# Patient Record
Sex: FEMALE | Race: BLACK OR AFRICAN AMERICAN | Employment: OTHER | URBAN - METROPOLITAN AREA
[De-identification: names, ages, dates, MRNs, and addresses within clinical notes are randomized per-mention and may not be internally consistent; named-entity substitution may affect disease eponyms.]

---

## 2017-01-26 ENCOUNTER — OFFICE VISIT (OUTPATIENT)
Dept: CARDIOLOGY CLINIC | Age: 82
End: 2017-01-26

## 2017-01-26 VITALS
HEIGHT: 63 IN | SYSTOLIC BLOOD PRESSURE: 122 MMHG | WEIGHT: 189 LBS | BODY MASS INDEX: 33.49 KG/M2 | DIASTOLIC BLOOD PRESSURE: 55 MMHG | HEART RATE: 83 BPM

## 2017-01-26 DIAGNOSIS — E78.5 HYPERLIPIDEMIA, UNSPECIFIED HYPERLIPIDEMIA TYPE: ICD-10-CM

## 2017-01-26 DIAGNOSIS — I50.32 CHRONIC DIASTOLIC HEART FAILURE (HCC): Primary | ICD-10-CM

## 2017-01-26 DIAGNOSIS — I05.9 MITRAL VALVE DISORDERS(424.0): ICD-10-CM

## 2017-01-26 DIAGNOSIS — I10 ESSENTIAL HYPERTENSION, BENIGN: ICD-10-CM

## 2017-01-26 NOTE — MR AVS SNAPSHOT
Visit Information Date & Time Provider Department Dept. Phone Encounter #  
 1/26/2017 10:30 AM Leah Butler MD [facility] [address] [place] [address] [id number] Follow-up Instructions Return in about 6 months (around 7/26/2017). Your Appointments 7/27/2017 10:30 AM  
ESTABLISHED PATIENT with Leah Butelr MD  
[facility]) Appt Note: 6 months [address] [facility], [address] [place] [address]  
[address] [facility], [address] [place] [address] Upcoming Health Maintenance Date Due DTaP/Tdap/Td series (1 - Tdap) 4/6/1956 ZOSTER VACCINE AGE 60> 4/6/1996 GLAUCOMA SCREENING Q2Y 4/6/2001 Pneumococcal 65+ Low/Medium Risk (1 of 2 - PCV13) 4/6/2001 MEDICARE YEARLY EXAM 4/6/2001 INFLUENZA AGE 9 TO ADULT 8/1/2016 Allergies as of 1/26/2017  Review Complete On: 1/26/2017 By: Leah Butler MD  
  
 Severity Noted Reaction Type Reactions Erythromycin    Not Reported This Time Current Immunizations  Never Reviewed No immunizations on file. Not reviewed this visit You Were Diagnosed With   
  
 Codes Comments Chronic diastolic heart failure (HCC)    -  Primary ICD-10-CM: I50.32 
ICD-9-CM: 428.32 stable 
mild edema Essential hypertension, benign     ICD-10-CM: I10 
ICD-9-CM: 401.1 stable Hyperlipidemia, unspecified hyperlipidemia type     ICD-10-CM: E78.5 ICD-9-CM: 272.4 stable Mitral valve disorders     ICD-10-CM: I05.9 ICD-9-CM: 424.0 trivial on last echo Vitals BP Pulse Height(growth percentile) Weight(growth percentile) BMI Smoking Status 122/55 83 5' 3\" (1.6 m) 189 lb (85.7 kg) 33.48 kg/m2 Never Smoker Vitals History BMI and BSA Data Body Mass Index Body Surface Area  
 33.48 kg/m 2 1.95 m 2 Preferred Pharmacy Pharmacy Name Phone [facility] [address] [facility], [address] [facility] [phone] Your Updated Medication List  
  
   
This list is accurate as of: 1/26/17 10:59 AM.  Always use your most recent med list.  
  
  
  
  
 aspirin 81 mg tablet Take 81 mg by mouth daily. atorvastatin 10 mg tablet Commonly known as:  LIPITOR  
take 1 tablet by mouth once daily  
  
 calcium 500 mg Tab Take  by mouth daily. cephALEXin 500 mg capsule Commonly known as:  KEFLEX  
  
 cholecalciferol 1,000 unit Cap Commonly known as:  VITAMIN D3 Take  by mouth daily. cyanocobalamin 1,000 mcg tablet Take 1,000 mcg by mouth daily. FEMARA 2.5 mg tablet Generic drug:  letrozole Take 2.5 mg by mouth daily. IBUPROFEN PO Take  by mouth as needed. metoprolol succinate 50 mg XL tablet Commonly known as:  TOPROL-XL  
take 1 tablet by mouth once daily  
  
 multivitamins with minerals Cap Take  by mouth daily. nitroglycerin 0.4 mg SL tablet Commonly known as:  NITROSTAT  
1 Tab by SubLINGual route every five (5) minutes as needed. Omega-3-DHA-EPA-Fish Oil 1,000 mg (120 mg-180 mg) Cap Take  by mouth daily. TYLENOL PO Take  by mouth as needed. valACYclovir 500 mg tablet Commonly known as:  VALTREX  
1,000 mg.  
  
 valsartan-hydroCHLOROthiazide 320-12.5 mg per tablet Commonly known as:  DIOVAN-HCT  
take 1 tablet by mouth once daily ZyrTEC 10 mg Cap Generic drug:  Cetirizine Take 10 mg by mouth as needed. Follow-up Instructions Return in about 6 months (around 7/26/2017). Introducing [facility]! David Pizano introduces Zachary Prell patient portal. Now you can access parts of your medical record, email your doctor's office, and request medication refills online. 1. In your internet browser, go to [email or web address] 2. Click on the First Time User? Click Here link in the Sign In box. You will see the New Member Sign Up page. 3. Enter your [employer] Access Code exactly as it appears below. You will not need to use this code after youve completed the sign-up process. If you do not sign up before the expiration date, you must request a new code. · [employer] Access Code: [email or web address] Expires: 4/26/2017 10:29 AM 
 
4. Enter the last four digits of your Social Security Number (xxxx) and Date of Birth (mm/dd/yyyy) as indicated and click Submit. You will be taken to the next sign-up page. 5. Create a [employer] ID. This will be your [employer] login ID and cannot be changed, so think of one that is secure and easy to remember. 6. Create a [employer] password. You can change your password at any time. 7. Enter your Password Reset Question and Answer. This can be used at a later time if you forget your password. 8. Enter your e-mail address. You will receive e-mail notification when new information is available in [address] 9. Click Sign Up. You can now view and download portions of your medical record. 10. Click the Download Summary menu link to download a portable copy of your medical information. If you have questions, please visit the Frequently Asked Questions section of the [employer] website. Remember, [employer] is NOT to be used for urgent needs. For medical emergencies, dial 911. Now available from your iPhone and Android! Please provide this summary of care documentation to your next provider. Your primary care clinician is listed as [facility]. If you have any questions after today's visit, please call [phone].

## 2017-01-26 NOTE — PROGRESS NOTES
HISTORY OF PRESENT ILLNESS  Ambar Saab is a [de-identified] y.o. female. HPI Comments: Patient had fever and chills last week -resolved with nsaids-no recurrence    CHF   The history is provided by the patient. This is a chronic problem. The problem has not changed since onset. Associated symptoms include shortness of breath. Pertinent negatives include no chest pain, no abdominal pain and no headaches. The symptoms are aggravated by exertion. Valvular Heart Disease   The history is provided by the patient. This is a chronic problem. The problem occurs constantly. The problem has been gradually improving. Associated symptoms include shortness of breath. Pertinent negatives include no chest pain, no abdominal pain and no headaches. Shortness of Breath   The history is provided by the patient. The problem occurs intermittently. The problem has been gradually improving. Pertinent negatives include no fever, no headaches, no cough, no sputum production, no hemoptysis, no wheezing, no PND, no orthopnea, no chest pain, no vomiting, no abdominal pain, no rash, no leg swelling and no claudication. The problem's precipitants include exercise (exertion). Review of Systems   Constitutional: Negative for chills and fever. HENT: Negative for nosebleeds. Eyes: Negative for blurred vision and double vision. Respiratory: Positive for shortness of breath. Negative for cough, hemoptysis, sputum production and wheezing. Cardiovascular: Negative for chest pain, palpitations, orthopnea, claudication, leg swelling and PND. Gastrointestinal: Negative for abdominal pain, heartburn, nausea and vomiting. Musculoskeletal: Negative for myalgias. Skin: Negative for rash. Neurological: Negative for dizziness, weakness and headaches. Endo/Heme/Allergies: Does not bruise/bleed easily.      Family History   Problem Relation Age of Onset    Cancer Mother      colon     Heart Disease Father     Breast Cancer Sister    Linda Heredia Cancer Sister 36     Breast    Diabetes Other      parent       Past Medical History   Diagnosis Date    [facility] 3/12    Cancer ([facility] [facility]      renal    Cancer ([facility]      Right breast    Chronic diastolic heart failure ([facility] [facility] 8/15/2014     exertional sob stable nyha class2     Colon polyps     Congestive heart failure ([facility]     Coronary atherosclerosis of native coronary artery     Essential hypertension, benign     Heart murmur     History of breast cancer     Hypertension     Mitral valve disorders     Mitral valve regurgitation     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     Other and unspecified hyperlipidemia     Radiation therapy     Radiation therapy complication        Past Surgical History   Procedure Laterality Date    Hx mastectomy  2012     Partial right    Hx hysterectomy      Hx nephrectomy  2002     right    Romana biopsy breast stereotactic         Social History   Substance Use Topics    Smoking status: Never Smoker    Smokeless tobacco: Never Used    Alcohol use Yes      Comment: occasinally       Allergies   Allergen Reactions    Erythromycin Not Reported This Time       Current Outpatient Prescriptions   Medication Sig    metoprolol succinate (TOPROL-XL) 50 mg XL tablet take 1 tablet by mouth once daily    atorvastatin (LIPITOR) 10 mg tablet take 1 tablet by mouth once daily    valsartan-hydrochlorothiazide (DIOVAN-HCT) 320-12.5 mg per tablet take 1 tablet by mouth once daily    cyanocobalamin 1,000 mcg tablet Take 1,000 mcg by mouth daily.  cholecalciferol (VITAMIN D3) 1,000 unit cap Take  by mouth daily.  nitroglycerin (NITROSTAT) 0.4 mg SL tablet 1 Tab by SubLINGual route every five (5) minutes as needed.  valACYclovir (VALTREX) 500 mg tablet 1,000 mg.  calcium 500 mg Tab Take  by mouth daily.  Omega-3-DHA-EPA-Fish Oil 1,000 (120-180) mg cap Take  by mouth daily.  Cetirizine (ZYRTEC) 10 mg cap Take 10 mg by mouth as needed.     aspirin 81 mg tablet Take 81 mg by mouth daily.  multivitamins with minerals Cap Take  by mouth daily.  letrozole (FEMARA) 2.5 mg tablet Take 2.5 mg by mouth daily.  ACETAMINOPHEN (TYLENOL PO) Take  by mouth as needed.  IBUPROFEN PO Take  by mouth as needed.  cephALEXin (KEFLEX) 500 mg capsule      No current facility-administered medications for this visit. Visit Vitals    /55    Pulse 83    Ht 5' 3\" (1.6 m)    Wt 85.7 kg (189 lb)    BMI 33.48 kg/m2         Physical Exam   Constitutional: She is oriented to person, place, and time. She appears well-developed and well-nourished. HENT:   Head: Normocephalic and atraumatic. Eyes: Conjunctivae are normal.   Neck: Neck supple. No JVD present. No tracheal deviation present. No thyromegaly present. Cardiovascular: Normal rate and regular rhythm. PMI is not displaced. Exam reveals no gallop and no decreased pulses. Murmur heard. Early systolic murmur is present with a grade of 2/6  at the upper right sternal border  Pulmonary/Chest: No respiratory distress. She has no wheezes. She has no rales. She exhibits no tenderness. Abdominal: Soft. There is no tenderness. Musculoskeletal: She exhibits no edema. Neurological: She is alert and oriented to person, place, and time. Skin: Skin is warm. Psychiatric: She has a normal mood and affect. CARDIOLOGY STUDIES 1/30/2013   Myocardial Perfusion Scan Result 3/07 mild ant wall defect with partial reversibility;   Echocardiogram - Complete Result 3/10 NORMAL EF,MILD MR,MILD TR;     Ms. Jadyn Ma has a reminder for a \"due or due soon\" health maintenance. I have asked that she contact her primary care provider for follow-up on this health maintenance. SUMMARY:echo:5/2016  Left ventricle: The cavity was small. Systolic function was normal.  Ejection fraction was estimated to be 65 %. No obvious wall motion  abnormalities identified in the views obtained.  There was mild concentric  hypertrophy. Doppler parameters were consistent with abnormal left  ventricular relaxation (grade 1 diastolic dysfunction). Mitral valve: There was trivial regurgitation. Tricuspid valve: Tricuspid regurgitation peak velocity: 2 m/sec. Pulmonary  artery systolic pressure: 19 mmHg. Assessment       ICD-10-CM ICD-9-CM    1. Chronic diastolic heart failure (HCC) I50.32 428.32     stable  mild edema   2. Essential hypertension, benign I10 401.1     stable   3. Hyperlipidemia, unspecified hyperlipidemia type E78.5 272.4     stable   4. Mitral valve disorders I05.9 424.0     trivial on last echo       No orders of the defined types were placed in this encounter. Follow-up Disposition:  Return in about 6 months (around 7/26/2017).

## 2017-01-26 NOTE — PROGRESS NOTES
1. Have you been to the ER, urgent care clinic since your last visit? Hospitalized since your last visit? No    2. Have you seen or consulted any other health care providers outside of the [address] [place] [address] since your last visit? Include any pap smears or colon screening. No     3. Since your last visit, have you had any of the following symptoms? None    4. Have you had any blood work, X-rays or cardiac testing? Per patient confirmed she had blood work recently at  PCP    5. Where do you normally have your labs drawn? PCP     6. Do you need any refills today?   no    Medications confirmed by patients med list

## 2017-01-26 NOTE — LETTER
[address] 1935 1/26/2017 Dear MD Jodee Deutsch MD Kaye Learned, MD 
 
I had the pleasure of evaluating  Ms. Vanegas in office today. Below are the relevant portions of my assessment and plan of care. ICD-10-CM ICD-9-CM 1. Chronic diastolic heart failure (HCC) I50.32 428.32   
 stable 
mild edema 2. Essential hypertension, benign I10 401.1   
 stable 3. Hyperlipidemia, unspecified hyperlipidemia type E78.5 272.4   
 stable 4. Mitral valve disorders I05.9 424.0   
 trivial on last echo Current Outpatient Prescriptions Medication Sig Dispense Refill  metoprolol succinate (TOPROL-XL) 50 mg XL tablet take 1 tablet by mouth once daily 30 Tab 6  
 atorvastatin (LIPITOR) 10 mg tablet take 1 tablet by mouth once daily 30 Tab 6  
 valsartan-hydrochlorothiazide (DIOVAN-HCT) 320-12.5 mg per tablet take 1 tablet by mouth once daily 30 Tab 6  cyanocobalamin 1,000 mcg tablet Take 1,000 mcg by mouth daily.  cholecalciferol (VITAMIN D3) 1,000 unit cap Take  by mouth daily.  nitroglycerin (NITROSTAT) 0.4 mg SL tablet 1 Tab by SubLINGual route every five (5) minutes as needed. 25 Tab 1  valACYclovir (VALTREX) 500 mg tablet 1,000 mg.  0  
 calcium 500 mg Tab Take  by mouth daily.  Omega-3-DHA-EPA-Fish Oil 1,000 (120-180) mg cap Take  by mouth daily.  Cetirizine (ZYRTEC) 10 mg cap Take 10 mg by mouth as needed.  aspirin 81 mg tablet Take 81 mg by mouth daily.  multivitamins with minerals Cap Take  by mouth daily.  letrozole (FEMARA) 2.5 mg tablet Take 2.5 mg by mouth daily.  ACETAMINOPHEN (TYLENOL PO) Take  by mouth as needed.  IBUPROFEN PO Take  by mouth as needed.  cephALEXin (KEFLEX) 500 mg capsule   0 No orders of the defined types were placed in this encounter. If you have questions, please do not hesitate to call me. I look forward to following Ms. Vanegas along with you.  
 
Sincerely, 
 Rose Marie Valentino MD

## 2017-03-30 ENCOUNTER — HOSPITAL ENCOUNTER (OUTPATIENT)
Dept: MAMMOGRAPHY | Age: 82
Discharge: HOME | End: 2017-03-30
Attending: PHYSICIAN ASSISTANT
Payer: COMMERCIAL

## 2017-03-30 DIAGNOSIS — Z85.3 HISTORY OF RIGHT BREAST CANCER: ICD-10-CM

## 2017-03-30 PROCEDURE — 77062 BREAST TOMOSYNTHESIS BI: CPT

## 2017-06-19 RX ORDER — NITROGLYCERIN 0.4 MG/1
0.4 TABLET SUBLINGUAL
Qty: 25 TAB | Refills: 1 | Status: SHIPPED | OUTPATIENT
Start: 2017-06-19 | End: 2019-02-07 | Stop reason: SDUPTHER

## 2017-06-28 ENCOUNTER — OFFICE VISIT (OUTPATIENT)
Dept: CARDIOLOGY CLINIC | Age: 82
End: 2017-06-28

## 2017-06-28 VITALS
DIASTOLIC BLOOD PRESSURE: 70 MMHG | BODY MASS INDEX: 33.66 KG/M2 | WEIGHT: 190 LBS | HEIGHT: 63 IN | HEART RATE: 65 BPM | SYSTOLIC BLOOD PRESSURE: 144 MMHG

## 2017-06-28 DIAGNOSIS — I25.10 ATHEROSCLEROSIS OF NATIVE CORONARY ARTERY OF NATIVE HEART WITHOUT ANGINA PECTORIS: Primary | ICD-10-CM

## 2017-06-28 DIAGNOSIS — I50.32 CHRONIC DIASTOLIC HEART FAILURE (HCC): ICD-10-CM

## 2017-06-28 DIAGNOSIS — E78.5 HYPERLIPIDEMIA, UNSPECIFIED HYPERLIPIDEMIA TYPE: ICD-10-CM

## 2017-06-28 DIAGNOSIS — I10 ESSENTIAL HYPERTENSION, BENIGN: ICD-10-CM

## 2017-06-28 NOTE — PROGRESS NOTES
HISTORY OF PRESENT ILLNESS  Sharron Humphreys is a 81 y.o. female. HPI Comments: Patient had fever and chills last week -resolved with nsaids-no recurrence    CHF   The history is provided by the patient. This is a chronic problem. The problem has not changed since onset. Associated symptoms include shortness of breath. Pertinent negatives include no chest pain, no abdominal pain and no headaches. The symptoms are aggravated by exertion. Valvular Heart Disease   The history is provided by the patient. This is a chronic problem. The problem occurs constantly. The problem has been gradually improving. Associated symptoms include shortness of breath. Pertinent negatives include no chest pain, no abdominal pain and no headaches. Shortness of Breath   The history is provided by the patient. The problem occurs intermittently. The problem has been gradually improving. Pertinent negatives include no fever, no headaches, no cough, no sputum production, no hemoptysis, no wheezing, no PND, no orthopnea, no chest pain, no vomiting, no abdominal pain, no rash, no leg swelling and no claudication. The problem's precipitants include exercise (exertion). Review of Systems   Constitutional: Negative for chills and fever. HENT: Negative for nosebleeds. Eyes: Negative for blurred vision and double vision. Respiratory: Positive for shortness of breath. Negative for cough, hemoptysis, sputum production and wheezing. Cardiovascular: Negative for chest pain, palpitations, orthopnea, claudication, leg swelling and PND. Gastrointestinal: Negative for abdominal pain, heartburn, nausea and vomiting. Musculoskeletal: Negative for myalgias. Skin: Negative for rash. Neurological: Negative for dizziness, weakness and headaches. Endo/Heme/Allergies: Does not bruise/bleed easily.      Family History   Problem Relation Age of Onset    Cancer Mother      colon     Heart Disease Father     Breast Cancer Sister    Kaitlin Rosalesdakshacira Cancer Sister 36     Breast    Diabetes Other      parent       Past Medical History:   Diagnosis Date    Breast [facility] ([facility] 3/12    Cancer ([facility]     renal    Cancer ([facility]     Right breast    Chronic diastolic heart failure ([facility] 8/15/2014    exertional sob stable nyha class2     Colon polyps     Congestive heart failure (HCC)     Coronary atherosclerosis of native coronary artery     Essential hypertension, benign     Heart murmur     History of breast cancer     Hypertension     Mitral valve disorders     Mitral valve regurgitation     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     Other and unspecified hyperlipidemia     Radiation therapy     Radiation therapy complication        Past Surgical History:   Procedure Laterality Date    HX HYSTERECTOMY      HX MASTECTOMY  2012    Partial right    HX NEPHRECTOMY  2002    right    BERTIN BIOPSY BREAST STEREOTACTIC         Social History   Substance Use Topics    Smoking status: Never Smoker    Smokeless tobacco: Never Used    Alcohol use Yes      Comment: occasinally       Allergies   Allergen Reactions    Erythromycin Not Reported This Time       Current Outpatient Prescriptions   Medication Sig    nitroglycerin (NITROSTAT) 0.4 mg SL tablet 1 Tab by SubLINGual route every five (5) minutes as needed for up to 3 doses.  metoprolol succinate (TOPROL-XL) 50 mg XL tablet take 1 tablet by mouth once daily    atorvastatin (LIPITOR) 10 mg tablet take 1 tablet by mouth once daily    valsartan-hydrochlorothiazide (DIOVAN-HCT) 320-12.5 mg per tablet take 1 tablet by mouth once daily    cyanocobalamin 1,000 mcg tablet Take 1,000 mcg by mouth daily.  cholecalciferol (VITAMIN D3) 1,000 unit cap Take  by mouth daily.  cephALEXin (KEFLEX) 500 mg capsule     valACYclovir (VALTREX) 500 mg tablet 1,000 mg.  calcium 500 mg Tab Take  by mouth daily.  Omega-3-DHA-EPA-Fish Oil 1,000 (120-180) mg cap Take  by mouth daily.     Cetirizine (ZYRTEC) 10 mg cap Take 10 mg by mouth as needed.  aspirin 81 mg tablet Take 81 mg by mouth daily.  multivitamins with minerals Cap Take  by mouth daily.  letrozole (FEMARA) 2.5 mg tablet Take 2.5 mg by mouth daily.  ACETAMINOPHEN (TYLENOL PO) Take  by mouth as needed.  IBUPROFEN PO Take  by mouth as needed. No current facility-administered medications for this visit. Visit Vitals    /70    Pulse 65    Ht 5' 3\" (1.6 m)    Wt 86.2 kg (190 lb)    BMI 33.66 kg/m2         Physical Exam   Constitutional: She is oriented to person, place, and time. She appears well-developed and well-nourished. HENT:   Head: Normocephalic and atraumatic. Eyes: Conjunctivae are normal.   Neck: Neck supple. No JVD present. No tracheal deviation present. No thyromegaly present. Cardiovascular: Normal rate and regular rhythm. PMI is not displaced. Exam reveals no gallop and no decreased pulses. Murmur heard. Early systolic murmur is present with a grade of 2/6  at the upper right sternal border  Pulmonary/Chest: No respiratory distress. She has no wheezes. She has no rales. She exhibits no tenderness. Abdominal: Soft. There is no tenderness. Musculoskeletal: She exhibits no edema. Neurological: She is alert and oriented to person, place, and time. Skin: Skin is warm. Psychiatric: She has a normal mood and affect. CARDIOLOGY STUDIES 1/30/2013   Myocardial Perfusion Scan Result 3/07 mild ant wall defect with partial reversibility;   Echocardiogram - Complete Result 3/10 NORMAL EF,MILD MR,MILD TR;   Some recent data might be hidden     Ms. Cornelia Witt has a reminder for a \"due or due soon\" health maintenance. I have asked that she contact her primary care provider for follow-up on this health maintenance. SUMMARY:echo:5/2016  Left ventricle: The cavity was small. Systolic function was normal.  Ejection fraction was estimated to be 65 %.  No obvious wall motion  abnormalities identified in the views obtained. There was mild concentric  hypertrophy. Doppler parameters were consistent with abnormal left  ventricular relaxation (grade 1 diastolic dysfunction). Mitral valve: There was trivial regurgitation. Tricuspid valve: Tricuspid regurgitation peak velocity: 2 m/sec. Pulmonary  artery systolic pressure: 19 mmHg. Assessment       ICD-10-CM ICD-9-CM    1. Atherosclerosis of native coronary artery of native heart without angina pectoris I25.10 414.01     stable   2. Chronic diastolic heart failure (HCC) I50.32 428.32     compenstated   3. Essential hypertension, benign I10 401.1     stable   4. Hyperlipidemia, unspecified hyperlipidemia type E78.5 272.4     stable  lab with pcp       No orders of the defined types were placed in this encounter. Follow-up Disposition:  Return in about 6 months (around 12/28/2017).

## 2017-06-28 NOTE — MR AVS SNAPSHOT
Visit Information Date & Time Provider Department Dept. Phone Encounter #  
 6/28/2017 10:15 AM Richard Antonio MD [facility] [address] [place] [address] [id number] Follow-up Instructions Return in about 6 months (around 12/28/2017). Your Appointments 1/10/2018 10:30 AM  
Office Visit with Richard Antonio MD  
[facility]) Appt Note: 6m  
 [address] [facility], [address] [facility] [address] [address], [address] [place] [address] [place] Upcoming Health Maintenance Date Due DTaP/Tdap/Td series (1 - Tdap) 4/6/1957 ZOSTER VACCINE AGE 60> 4/6/1996 GLAUCOMA SCREENING Q2Y 4/6/2001 Pneumococcal 65+ Low/Medium Risk (1 of 2 - PCV13) 4/6/2001 MEDICARE YEARLY EXAM 4/6/2001 INFLUENZA AGE 9 TO ADULT 8/1/2017 Allergies as of 6/28/2017  Review Complete On: 6/28/2017 By: Richard Antonio MD  
  
 Severity Noted Reaction Type Reactions Erythromycin    Not Reported This Time Current Immunizations  Never Reviewed No immunizations on file. Not reviewed this visit You Were Diagnosed With   
  
 Codes Comments Atherosclerosis of native coronary artery of native heart without angina pectoris    -  Primary ICD-10-CM: I25.10 ICD-9-CM: 414.01 stable Chronic diastolic heart failure (HCC)     ICD-10-CM: I50.32 
ICD-9-CM: 428.32 compenstated Essential hypertension, benign     ICD-10-CM: I10 
ICD-9-CM: 401.1 stable Hyperlipidemia, unspecified hyperlipidemia type     ICD-10-CM: E78.5 ICD-9-CM: 272.4 stable 
lab with pcp Vitals BP Pulse Height(growth percentile) Weight(growth percentile) BMI Smoking Status 144/70 65 5' 3\" (1.6 m) 190 lb (86.2 kg) 33.66 kg/m2 Never Smoker Vitals History BMI and BSA Data Body Mass Index Body Surface Area  
 33.66 kg/m 2 1.96 m 2 Preferred Pharmacy Pharmacy Name Phone [facility] [address] [facility], [address] [facility] [phone] Your Updated Medication List  
  
   
This list is accurate as of: 6/28/17 10:47 AM.  Always use your most recent med list.  
  
  
  
  
 aspirin 81 mg tablet Take 81 mg by mouth daily. atorvastatin 10 mg tablet Commonly known as:  LIPITOR  
take 1 tablet by mouth once daily  
  
 calcium 500 mg Tab Take  by mouth daily. cephALEXin 500 mg capsule Commonly known as:  KEFLEX  
  
 cholecalciferol 1,000 unit Cap Commonly known as:  VITAMIN D3 Take  by mouth daily. cyanocobalamin 1,000 mcg tablet Take 1,000 mcg by mouth daily. FEMARA 2.5 mg tablet Generic drug:  letrozole Take 2.5 mg by mouth daily. IBUPROFEN PO Take  by mouth as needed. metoprolol succinate 50 mg XL tablet Commonly known as:  TOPROL-XL  
take 1 tablet by mouth once daily  
  
 multivitamins with minerals Cap Take  by mouth daily. nitroglycerin 0.4 mg SL tablet Commonly known as:  NITROSTAT  
1 Tab by SubLINGual route every five (5) minutes as needed for up to 3 doses. Omega-3-DHA-EPA-Fish Oil 1,000 mg (120 mg-180 mg) Cap Take  by mouth daily. TYLENOL PO Take  by mouth as needed. valACYclovir 500 mg tablet Commonly known as:  VALTREX  
1,000 mg.  
  
 valsartan-hydroCHLOROthiazide 320-12.5 mg per tablet Commonly known as:  DIOVAN-HCT  
take 1 tablet by mouth once daily ZyrTEC 10 mg Cap Generic drug:  Cetirizine Take 10 mg by mouth as needed. Follow-up Instructions Return in about 6 months (around 12/28/2017). Introducing [facility] & HEALTH SERVICES! Mary Jane Bergeron introduces JumpCam patient portal. Now you can access parts of your medical record, email your doctor's office, and request medication refills online. 1. In your internet browser, go to [email or web address] 2. Click on the First Time User? Click Here link in the Sign In box. You will see the New Member Sign Up page. 3. Enter your Tokalas Access Code exactly as it appears below. You will not need to use this code after youve completed the sign-up process. If you do not sign up before the expiration date, you must request a new code. · Tokalas Access Code: [email or web address] Expires: 9/26/2017 10:47 AM 
 
4. Enter the last four digits of your Social Security Number (xxxx) and Date of Birth (mm/dd/yyyy) as indicated and click Submit. You will be taken to the next sign-up page. 5. Create a Tokalas ID. This will be your Tokalas login ID and cannot be changed, so think of one that is secure and easy to remember. 6. Create a Tokalas password. You can change your password at any time. 7. Enter your Password Reset Question and Answer. This can be used at a later time if you forget your password. 8. Enter your e-mail address. You will receive e-mail notification when new information is available in [address] 9. Click Sign Up. You can now view and download portions of your medical record. 10. Click the Download Summary menu link to download a portable copy of your medical information. If you have questions, please visit the Frequently Asked Questions section of the Tokalas website. Remember, Tokalas is NOT to be used for urgent needs. For medical emergencies, dial 911. Now available from your iPhone and Android! Please provide this summary of care documentation to your next provider. Your primary care clinician is listed as [facility]. If you have any questions after today's visit, please call [phone].

## 2017-07-07 RX ORDER — VALSARTAN AND HYDROCHLOROTHIAZIDE 320; 12.5 MG/1; MG/1
TABLET, FILM COATED ORAL
Qty: 30 TAB | Refills: 6 | Status: SHIPPED | OUTPATIENT
Start: 2017-07-07 | End: 2018-02-04 | Stop reason: SDUPTHER

## 2017-09-08 RX ORDER — ATORVASTATIN CALCIUM 10 MG/1
TABLET, FILM COATED ORAL
Qty: 30 TAB | Refills: 6 | Status: SHIPPED | OUTPATIENT
Start: 2017-09-08 | End: 2018-04-10 | Stop reason: SDUPTHER

## 2017-11-09 RX ORDER — METOPROLOL SUCCINATE 50 MG/1
TABLET, EXTENDED RELEASE ORAL
Qty: 30 TAB | Refills: 6 | Status: SHIPPED | OUTPATIENT
Start: 2017-11-09 | End: 2018-06-19 | Stop reason: SDUPTHER

## 2018-01-12 ENCOUNTER — OFFICE VISIT (OUTPATIENT)
Dept: CARDIOLOGY CLINIC | Age: 83
End: 2018-01-12

## 2018-01-12 VITALS
DIASTOLIC BLOOD PRESSURE: 63 MMHG | HEIGHT: 63 IN | WEIGHT: 189 LBS | HEART RATE: 64 BPM | SYSTOLIC BLOOD PRESSURE: 142 MMHG | BODY MASS INDEX: 33.49 KG/M2

## 2018-01-12 DIAGNOSIS — I25.10 ATHEROSCLEROSIS OF NATIVE CORONARY ARTERY OF NATIVE HEART WITHOUT ANGINA PECTORIS: ICD-10-CM

## 2018-01-12 DIAGNOSIS — I50.32 CHRONIC DIASTOLIC HEART FAILURE (HCC): Primary | ICD-10-CM

## 2018-01-12 DIAGNOSIS — I10 ESSENTIAL HYPERTENSION, BENIGN: ICD-10-CM

## 2018-01-12 DIAGNOSIS — E78.5 HYPERLIPIDEMIA, UNSPECIFIED HYPERLIPIDEMIA TYPE: ICD-10-CM

## 2018-01-12 NOTE — PROGRESS NOTES
1. Have you been to the ER, urgent care clinic since your last visit? Hospitalized since your last visit? No    2. Have you seen or consulted any other health care providers outside of the [address] [place] [address] since your last visit? Include any pap smears or colon screening.  Yes Oncology Routine  PCP Routine

## 2018-01-12 NOTE — PROGRESS NOTES
HISTORY OF PRESENT ILLNESS  Tammy Richards is a 81 y.o. female. CHF   The history is provided by the patient. This is a chronic problem. The problem has not changed since onset. Associated symptoms include shortness of breath. Pertinent negatives include no chest pain, no abdominal pain and no headaches. The symptoms are aggravated by exertion. Valvular Heart Disease   The history is provided by the patient. This is a chronic problem. The problem occurs constantly. The problem has been gradually improving. Associated symptoms include shortness of breath. Pertinent negatives include no chest pain, no abdominal pain and no headaches. Shortness of Breath   The history is provided by the patient. The problem occurs intermittently. The problem has been gradually improving. Pertinent negatives include no fever, no headaches, no cough, no sputum production, no hemoptysis, no wheezing, no PND, no orthopnea, no chest pain, no vomiting, no abdominal pain, no rash, no leg swelling and no claudication. The problem's precipitants include exercise (exertion). Review of Systems   Constitutional: Negative for chills and fever. HENT: Negative for nosebleeds. Eyes: Negative for blurred vision and double vision. Respiratory: Positive for shortness of breath. Negative for cough, hemoptysis, sputum production and wheezing. Cardiovascular: Negative for chest pain, palpitations, orthopnea, claudication, leg swelling and PND. Gastrointestinal: Negative for abdominal pain, heartburn, nausea and vomiting. Musculoskeletal: Negative for myalgias. Skin: Negative for rash. Neurological: Negative for dizziness, weakness and headaches. Endo/Heme/Allergies: Does not bruise/bleed easily.      Family History   Problem Relation Age of Onset    Cancer Mother      colon     Heart Disease Father     Breast Cancer Sister     Cancer Sister 36     Breast    Diabetes Other      parent       Past Medical History: Diagnosis Date    Breast cancer ([facility] 3/12    [facility]     renal    Cancer ([facility]     Right breast    Chronic diastolic heart failure ([facility] 8/15/2014    exertional sob stable nyha class2     Colon polyps     Congestive heart failure (HCC)     Coronary atherosclerosis of native coronary artery     Essential hypertension, benign     Heart murmur     History of breast cancer     Hypertension     Mitral valve disorders     Mitral valve regurgitation     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     Other and unspecified hyperlipidemia     Radiation therapy     Radiation therapy complication        Past Surgical History:   Procedure Laterality Date    HX HYSTERECTOMY      HX MASTECTOMY  2012    Partial right    HX NEPHRECTOMY  2002    right    BERTIN BIOPSY BREAST STEREOTACTIC         Social History   Substance Use Topics    Smoking status: Never Smoker    Smokeless tobacco: Never Used    Alcohol use Yes      Comment: occasinally       Allergies   Allergen Reactions    Erythromycin Not Reported This Time       Current Outpatient Prescriptions   Medication Sig    metoprolol succinate (TOPROL-XL) 50 mg XL tablet take 1 tablet by mouth once daily    atorvastatin (LIPITOR) 10 mg tablet take 1 tablet by mouth once daily    valsartan-hydroCHLOROthiazide (DIOVAN-HCT) 320-12.5 mg per tablet take 1 tablet by mouth once daily    nitroglycerin (NITROSTAT) 0.4 mg SL tablet 1 Tab by SubLINGual route every five (5) minutes as needed for up to 3 doses.  cyanocobalamin 1,000 mcg tablet Take 1,000 mcg by mouth daily.  cholecalciferol (VITAMIN D3) 1,000 unit cap Take  by mouth daily.  cephALEXin (KEFLEX) 500 mg capsule     valACYclovir (VALTREX) 500 mg tablet 1,000 mg.  calcium 500 mg Tab Take  by mouth daily.  Omega-3-DHA-EPA-Fish Oil 1,000 (120-180) mg cap Take  by mouth daily.  Cetirizine (ZYRTEC) 10 mg cap Take 10 mg by mouth as needed.     aspirin 81 mg tablet Take 81 mg by mouth daily.    multivitamins with minerals Cap Take  by mouth daily.  letrozole (FEMARA) 2.5 mg tablet Take 2.5 mg by mouth daily.  ACETAMINOPHEN (TYLENOL PO) Take  by mouth as needed.  IBUPROFEN PO Take  by mouth as needed. No current facility-administered medications for this visit. Visit Vitals    /63    Pulse 64    Ht 5' 3\" (1.6 m)    Wt 85.7 kg (189 lb)    BMI 33.48 kg/m2         Physical Exam   Constitutional: She is oriented to person, place, and time. She appears well-developed and well-nourished. HENT:   Head: Normocephalic and atraumatic. Eyes: Conjunctivae are normal.   Neck: Neck supple. No JVD present. No tracheal deviation present. No thyromegaly present. Cardiovascular: Normal rate and regular rhythm. PMI is not displaced. Exam reveals no gallop and no decreased pulses. Murmur heard. Early systolic murmur is present with a grade of 2/6  at the upper right sternal border  Pulmonary/Chest: No respiratory distress. She has no wheezes. She has no rales. She exhibits no tenderness. Abdominal: Soft. There is no tenderness. Musculoskeletal: She exhibits no edema. Neurological: She is alert and oriented to person, place, and time. Skin: Skin is warm. Psychiatric: She has a normal mood and affect. CARDIOLOGY STUDIES 1/30/2013   Myocardial Perfusion Scan Result 3/07 mild ant wall defect with partial reversibility;   Echocardiogram - Complete Result 3/10 NORMAL EF,MILD MR,MILD TR;   Some recent data might be hidden     Ms. Clarence Calabrese has a reminder for a \"due or due soon\" health maintenance. I have asked that she contact her primary care provider for follow-up on this health maintenance. SUMMARY:echo:5/2016  Left ventricle: The cavity was small. Systolic function was normal.  Ejection fraction was estimated to be 65 %. No obvious wall motion  abnormalities identified in the views obtained. There was mild concentric  hypertrophy.  Doppler parameters were consistent with abnormal left  ventricular relaxation (grade 1 diastolic dysfunction). Mitral valve: There was trivial regurgitation. Tricuspid valve: Tricuspid regurgitation peak velocity: 2 m/sec. Pulmonary  artery systolic pressure: 19 mmHg. Assessment       ICD-10-CM ICD-9-CM    1. Chronic diastolic heart failure (HCC) I50.32 428.32     stable  nyha class2 stable   2. Atherosclerosis of native coronary artery of native heart without angina pectoris I25.10 414.01     stable   3. Essential hypertension, benign I10 401.1     stable   4. Hyperlipidemia, unspecified hyperlipidemia type E78.5 272.4     on statin  lab with pcp       No orders of the defined types were placed in this encounter. Follow-up Disposition:  Return in about 6 months (around 7/12/2018).

## 2018-01-12 NOTE — LETTER
[address] 1935 1/12/2018 Dear Kaity Mak MD 
 
I had the pleasure of evaluating  Ms. Vanegas in office today. Below are the relevant portions of my assessment and plan of care. ICD-10-CM ICD-9-CM 1. Chronic diastolic heart failure (HCC) I50.32 428.32   
 stable 
nyha class2 stable 2. Atherosclerosis of native coronary artery of native heart without angina pectoris I25.10 414.01   
 stable 3. Essential hypertension, benign I10 401.1   
 stable 4. Hyperlipidemia, unspecified hyperlipidemia type E78.5 272.4   
 on statin 
lab with pcp Current Outpatient Prescriptions Medication Sig Dispense Refill  metoprolol succinate (TOPROL-XL) 50 mg XL tablet take 1 tablet by mouth once daily 30 Tab 6  
 atorvastatin (LIPITOR) 10 mg tablet take 1 tablet by mouth once daily 30 Tab 6  
 valsartan-hydroCHLOROthiazide (DIOVAN-HCT) 320-12.5 mg per tablet take 1 tablet by mouth once daily 30 Tab 6  
 nitroglycerin (NITROSTAT) 0.4 mg SL tablet 1 Tab by SubLINGual route every five (5) minutes as needed for up to 3 doses. 25 Tab 1  cyanocobalamin 1,000 mcg tablet Take 1,000 mcg by mouth daily.  cholecalciferol (VITAMIN D3) 1,000 unit cap Take  by mouth daily.  cephALEXin (KEFLEX) 500 mg capsule   0  
 valACYclovir (VALTREX) 500 mg tablet 1,000 mg.  0  
 calcium 500 mg Tab Take  by mouth daily.  Omega-3-DHA-EPA-Fish Oil 1,000 (120-180) mg cap Take  by mouth daily.  Cetirizine (ZYRTEC) 10 mg cap Take 10 mg by mouth as needed.  aspirin 81 mg tablet Take 81 mg by mouth daily.  multivitamins with minerals Cap Take  by mouth daily.  letrozole (FEMARA) 2.5 mg tablet Take 2.5 mg by mouth daily.  ACETAMINOPHEN (TYLENOL PO) Take  by mouth as needed.  IBUPROFEN PO Take  by mouth as needed. No orders of the defined types were placed in this encounter. If you have questions, please do not hesitate to call me.   I look forward to following Ms. Vanegas along with you. Sincerely, Sharri Blackburn MD

## 2018-01-12 NOTE — MR AVS SNAPSHOT
Visit Information Date & Time Provider Department Dept. Phone Encounter #  
 1/12/2018 10:30 AM Ninfa Martinez MD [facility] [address] [place] [address] [id number] Follow-up Instructions Return in about 6 months (around 7/12/2018). Your Appointments 7/13/2018 10:15 AM  
ESTABLISHED PATIENT with Ninfa Martinez MD  
[facility]) Appt Note: 6 months [address] [facility], [address] [facility] [id number], [address] [place] [address] [place] Upcoming Health Maintenance Date Due DTaP/Tdap/Td series (1 - Tdap) 4/6/1957 ZOSTER VACCINE AGE 60> 2/6/1996 GLAUCOMA SCREENING Q2Y 4/6/2001 Pneumococcal 65+ Low/Medium Risk (1 of 2 - PCV13) 4/6/2001 MEDICARE YEARLY EXAM 4/6/2001 Influenza Age 5 to Adult 8/1/2017 Allergies as of 1/12/2018  Review Complete On: 1/12/2018 By: Ninfa Martinez MD  
  
 Severity Noted Reaction Type Reactions Erythromycin    Not Reported This Time Current Immunizations  Never Reviewed No immunizations on file. Not reviewed this visit You Were Diagnosed With   
  
 Codes Comments Chronic diastolic heart failure (HCC)    -  Primary ICD-10-CM: I50.32 
ICD-9-CM: 428.32 stable 
nyha class2 stable Atherosclerosis of native coronary artery of native heart without angina pectoris     ICD-10-CM: I25.10 ICD-9-CM: 414.01 stable Essential hypertension, benign     ICD-10-CM: I10 
ICD-9-CM: 401.1 stable Hyperlipidemia, unspecified hyperlipidemia type     ICD-10-CM: E78.5 ICD-9-CM: 272.4 on statin 
lab with pcp Vitals BP Pulse Height(growth percentile) Weight(growth percentile) BMI Smoking Status 142/63 64 5' 3\" (1.6 m) 189 lb (85.7 kg) 33.48 kg/m2 Never Smoker Vitals History BMI and BSA Data Body Mass Index Body Surface Area  
 33.48 kg/m 2 1.95 m 2 Preferred Pharmacy Pharmacy Name Phone [facility] [address] [facility], [address] [place] [facility] [phone] Your Updated Medication List  
  
   
This list is accurate as of: 1/12/18 10:51 AM.  Always use your most recent med list.  
  
  
  
  
 aspirin 81 mg tablet Take 81 mg by mouth daily. atorvastatin 10 mg tablet Commonly known as:  LIPITOR  
take 1 tablet by mouth once daily  
  
 calcium 500 mg Tab Take  by mouth daily. cephALEXin 500 mg capsule Commonly known as:  KEFLEX  
  
 cholecalciferol 1,000 unit Cap Commonly known as:  VITAMIN D3 Take  by mouth daily. cyanocobalamin 1,000 mcg tablet Take 1,000 mcg by mouth daily. FEMARA 2.5 mg tablet Generic drug:  letrozole Take 2.5 mg by mouth daily. IBUPROFEN PO Take  by mouth as needed. metoprolol succinate 50 mg XL tablet Commonly known as:  TOPROL-XL  
take 1 tablet by mouth once daily  
  
 multivitamins with minerals Cap Take  by mouth daily. nitroglycerin 0.4 mg SL tablet Commonly known as:  NITROSTAT  
1 Tab by SubLINGual route every five (5) minutes as needed for up to 3 doses. Omega-3-DHA-EPA-Fish Oil 1,000 mg (120 mg-180 mg) Cap Take  by mouth daily. TYLENOL PO Take  by mouth as needed. valACYclovir 500 mg tablet Commonly known as:  VALTREX  
1,000 mg.  
  
 valsartan-hydroCHLOROthiazide 320-12.5 mg per tablet Commonly known as:  DIOVAN-HCT  
take 1 tablet by mouth once daily ZyrTEC 10 mg Cap Generic drug:  Cetirizine Take 10 mg by mouth as needed. Follow-up Instructions Return in about 6 months (around 7/12/2018). Introducing [facility] & HEALTH SERVICES! [employer] introduces [employer] patient portal. Now you can access parts of your medical record, email your doctor's office, and request medication refills online. 1. In your internet browser, go to [email or web address] 2. Click on the First Time User? Click Here link in the Sign In box. You will see the New Member Sign Up page. 3. Enter your [employer] Access Code exactly as it appears below. You will not need to use this code after youve completed the sign-up process. If you do not sign up before the expiration date, you must request a new code. · [employer] Access Code: [email or web address] Expires: 4/12/2018 10:30 AM 
 
4. Enter the last four digits of your Social Security Number (xxxx) and Date of Birth (mm/dd/yyyy) as indicated and click Submit. You will be taken to the next sign-up page. 5. Create a StartSpanish ID. This will be your StartSpanish login ID and cannot be changed, so think of one that is secure and easy to remember. 6. Create a Start[employer] password. You can change your password at any time. 7. Enter your Password Reset Question and Answer. This can be used at a later time if you forget your password. 8. Enter your e-mail address. You will receive e-mail notification when new information is available in [address] 9. Click Sign Up. You can now view and download portions of your medical record. 10. Click the Download Summary menu link to download a portable copy of your medical information. If you have questions, please visit the Frequently Asked Questions section of the [employer] website. Remember, Start[employer] is NOT to be used for urgent needs. For medical emergencies, dial 911. Now available from your iPhone and Android! Please provide this summary of care documentation to your next provider. Your primary care clinician is listed as [facility]. If you have any questions after today's visit, please call [phone].

## 2018-02-04 DIAGNOSIS — I50.32 CHRONIC DIASTOLIC CONGESTIVE HEART FAILURE (HCC): Primary | ICD-10-CM

## 2018-02-05 RX ORDER — VALSARTAN AND HYDROCHLOROTHIAZIDE 320; 12.5 MG/1; MG/1
TABLET, FILM COATED ORAL
Qty: 30 TAB | Refills: 6 | Status: SHIPPED | OUTPATIENT
Start: 2018-02-05 | End: 2018-07-27

## 2018-02-06 DIAGNOSIS — I50.32 CHRONIC DIASTOLIC CONGESTIVE HEART FAILURE (HCC): ICD-10-CM

## 2018-03-02 ENCOUNTER — HOSPITAL ENCOUNTER (OUTPATIENT)
Dept: MAMMOGRAPHY | Age: 83
Discharge: HOME | End: 2018-03-02
Attending: INTERNAL MEDICINE
Payer: MEDICARE

## 2018-03-02 DIAGNOSIS — C50.411 MALIGNANT NEOPLASM OF UPPER-OUTER QUADRANT OF RIGHT FEMALE BREAST (HCC): ICD-10-CM

## 2018-03-02 PROCEDURE — 77062 BREAST TOMOSYNTHESIS BI: CPT

## 2018-04-10 RX ORDER — ATORVASTATIN CALCIUM 10 MG/1
TABLET, FILM COATED ORAL
Qty: 30 TAB | Refills: 6 | Status: SHIPPED | OUTPATIENT
Start: 2018-04-10 | End: 2018-11-10 | Stop reason: SDUPTHER

## 2018-04-20 ENCOUNTER — HOSPITAL ENCOUNTER (OUTPATIENT)
Dept: LAB | Age: 83
Discharge: HOME | End: 2018-04-20
Payer: MEDICARE

## 2018-04-20 LAB
ALBUMIN SERPL-MCNC: 3.5 G/DL (ref 3.4–5)
ALBUMIN/GLOB SERPL: 0.8 {RATIO} (ref 0.8–1.7)
ALP SERPL-CCNC: 93 U/L (ref 45–117)
ALT SERPL-CCNC: 23 U/L (ref 13–56)
ANION GAP SERPL CALC-SCNC: 5 MMOL/L (ref 3–18)
AST SERPL-CCNC: 20 U/L (ref 15–37)
BILIRUB SERPL-MCNC: 0.6 MG/DL (ref 0.2–1)
BUN SERPL-MCNC: 22 MG/DL (ref 7–18)
BUN/CREAT SERPL: 20 (ref 12–20)
CALCIUM SERPL-MCNC: 9.3 MG/DL (ref 8.5–10.1)
CHLORIDE SERPL-SCNC: 107 MMOL/L (ref 100–108)
CO2 SERPL-SCNC: 27 MMOL/L (ref 21–32)
CREAT SERPL-MCNC: 1.12 MG/DL (ref 0.6–1.3)
ERYTHROCYTE [DISTWIDTH] IN BLOOD BY AUTOMATED COUNT: 13.5 % (ref 11.6–14.5)
GLOBULIN SER CALC-MCNC: 4.5 G/DL (ref 2–4)
GLUCOSE SERPL-MCNC: 105 MG/DL (ref 74–99)
HCT VFR BLD AUTO: 39.8 % (ref 35–45)
HGB BLD-MCNC: 13.4 G/DL (ref 12–16)
MCH RBC QN AUTO: 30.7 PG (ref 24–34)
MCHC RBC AUTO-ENTMCNC: 33.7 G/DL (ref 31–37)
MCV RBC AUTO: 91.3 FL (ref 74–97)
PLATELET # BLD AUTO: 160 K/UL (ref 135–420)
PMV BLD AUTO: 11.3 FL (ref 9.2–11.8)
POTASSIUM SERPL-SCNC: 4.3 MMOL/L (ref 3.5–5.5)
PROT SERPL-MCNC: 8 G/DL (ref 6.4–8.2)
RBC # BLD AUTO: 4.36 M/UL (ref 4.2–5.3)
SODIUM SERPL-SCNC: 139 MMOL/L (ref 136–145)
WBC # BLD AUTO: 4 K/UL (ref 4.6–13.2)

## 2018-04-20 PROCEDURE — 36415 COLL VENOUS BLD VENIPUNCTURE: CPT | Performed by: INTERNAL MEDICINE

## 2018-04-20 PROCEDURE — 80053 COMPREHEN METABOLIC PANEL: CPT | Performed by: INTERNAL MEDICINE

## 2018-04-20 PROCEDURE — 85027 COMPLETE CBC AUTOMATED: CPT | Performed by: INTERNAL MEDICINE

## 2018-06-19 RX ORDER — METOPROLOL SUCCINATE 50 MG/1
TABLET, EXTENDED RELEASE ORAL
Qty: 30 TAB | Refills: 6 | Status: SHIPPED | OUTPATIENT
Start: 2018-06-19 | End: 2018-06-25 | Stop reason: SDUPTHER

## 2018-06-25 RX ORDER — METOPROLOL SUCCINATE 50 MG/1
TABLET, EXTENDED RELEASE ORAL
Qty: 30 TAB | Refills: 6 | Status: SHIPPED | OUTPATIENT
Start: 2018-06-25 | End: 2019-03-11 | Stop reason: SDUPTHER

## 2018-07-27 ENCOUNTER — OFFICE VISIT (OUTPATIENT)
Dept: CARDIOLOGY CLINIC | Age: 83
End: 2018-07-27

## 2018-07-27 VITALS
DIASTOLIC BLOOD PRESSURE: 66 MMHG | WEIGHT: 178 LBS | HEIGHT: 63 IN | SYSTOLIC BLOOD PRESSURE: 130 MMHG | BODY MASS INDEX: 31.54 KG/M2 | HEART RATE: 68 BPM

## 2018-07-27 DIAGNOSIS — I50.32 CHRONIC DIASTOLIC HEART FAILURE (HCC): Primary | ICD-10-CM

## 2018-07-27 DIAGNOSIS — E78.5 HYPERLIPIDEMIA, UNSPECIFIED HYPERLIPIDEMIA TYPE: ICD-10-CM

## 2018-07-27 DIAGNOSIS — I08.0 MULTIPLE LESIONS OF MITRAL AND AORTIC VALVE: ICD-10-CM

## 2018-07-27 DIAGNOSIS — I25.10 ATHEROSCLEROSIS OF NATIVE CORONARY ARTERY OF NATIVE HEART WITHOUT ANGINA PECTORIS: ICD-10-CM

## 2018-07-27 DIAGNOSIS — I10 ESSENTIAL HYPERTENSION, BENIGN: ICD-10-CM

## 2018-07-27 RX ORDER — OLMESARTAN MEDOXOMIL AND HYDROCHLOROTHIAZIDE 40/12.5 40; 12.5 MG/1; MG/1
1 TABLET ORAL DAILY
Qty: 30 TAB | Refills: 6 | Status: SHIPPED | OUTPATIENT
Start: 2018-07-27 | End: 2019-02-11 | Stop reason: SDUPTHER

## 2018-07-27 NOTE — LETTER
[address] [place] 1935 7/27/2018 Dear James Bhat MD 
 
I had the pleasure of evaluating  Ms. Vanegas in office today. Below are the relevant portions of my assessment and plan of care. ICD-10-CM ICD-9-CM 1. Chronic diastolic heart failure (HCC) I50.32 428.32 Stable. Compensated with [place] heart classification 2  
2. 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 Asymptomatic 3. Atherosclerosis of native coronary artery of native heart without angina pectoris I25.10 414.01 Stable. No angina 4. Essential hypertension, benign I10 401.1 Stable continue medical management 5. Hyperlipidemia, unspecified hyperlipidemia type E78.5 272.4 On statin therapy. Lab with PCP Current Outpatient Prescriptions Medication Sig Dispense Refill  metoprolol succinate (TOPROL-XL) 50 mg XL tablet take 1 tablet by mouth once daily 30 Tab 6  
 atorvastatin (LIPITOR) 10 mg tablet take 1 tablet by mouth once daily 30 Tab 6  
 valsartan-hydroCHLOROthiazide (DIOVAN-HCT) 320-12.5 mg per tablet take 1 tablet by mouth once daily 30 Tab 6  
 nitroglycerin (NITROSTAT) 0.4 mg SL tablet 1 Tab by SubLINGual route every five (5) minutes as needed for up to 3 doses. 25 Tab 1  cyanocobalamin 1,000 mcg tablet Take 1,000 mcg by mouth daily.  cholecalciferol (VITAMIN D3) 1,000 unit cap Take  by mouth daily.  cephALEXin (KEFLEX) 500 mg capsule   0  
 valACYclovir (VALTREX) 500 mg tablet 1,000 mg.  0  
 calcium 500 mg Tab Take  by mouth daily.  Omega-3-DHA-EPA-Fish Oil 1,000 (120-180) mg cap Take  by mouth daily.  Cetirizine (ZYRTEC) 10 mg cap Take 10 mg by mouth as needed.  aspirin 81 mg tablet Take 81 mg by mouth daily.  multivitamins with minerals Cap Take  by mouth daily.  letrozole (FEMARA) 2.5 mg tablet Take 2.5 mg by mouth daily.  ACETAMINOPHEN (TYLENOL PO) Take  by mouth as needed.  IBUPROFEN PO Take  by mouth as needed. No orders of the defined types were placed in this encounter. If you have questions, please do not hesitate to call me. I look forward to following Ms. Vanegas along with you. Sincerely, Mary Perdomo MD

## 2018-07-27 NOTE — MR AVS SNAPSHOT
[facility] 
 
 
 [address] [facility], [address] [facility] [address] 
[phone] Patient: Alba Lisa MRN: [id number] :1935 Visit Information Date & Time Provider Department Dept. Phone Encounter #  
 2018 10:15 AM Saida Perales MD [facility] [address] [place] [address] [id number] Follow-up Instructions Return in about 6 months (around 2019). Your Appointments 2019 11:15 AM  
Office Visit with Saida Perales MD  
[facility]) Appt Note: [address] [facility], [address] [facility] [address] [address], [address] [place] [id number] [address] Upcoming Health Maintenance Date Due DTaP/Tdap/Td series (1 - Tdap) 1957 ZOSTER VACCINE AGE 60> 1996 GLAUCOMA SCREENING Q2Y 2001 Pneumococcal 65+ High/Highest Risk (1 of 2 - PCV13) 2001 Influenza Age 5 to Adult 2018 Allergies as of 2018  Review Complete On: 2018 By: Saida Perales MD  
  
 Severity Noted Reaction Type Reactions Erythromycin    Not Reported This Time Current Immunizations  Never Reviewed No immunizations on file. Not reviewed this visit You Were Diagnosed With   
  
 Codes Comments Chronic diastolic heart failure (HCC)    -  Primary ICD-10-CM: I50.32 
ICD-9-CM: 428.32 Stable. 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 Asymptomatic Atherosclerosis of native coronary artery of native heart without angina pectoris     ICD-10-CM: I25.10 ICD-9-CM: 414.01 Stable. No angina Essential hypertension, benign     ICD-10-CM: I10 
ICD-9-CM: 401.1 Stable continue medical management Hyperlipidemia, unspecified hyperlipidemia type     ICD-10-CM: E78.5 ICD-9-CM: 272.4 On statin therapy. Lab with PCP Vitals BP Pulse Height(growth percentile) Weight(growth percentile) BMI Smoking Status 130/66 68 5' 3\" (1.6 m) 178 lb (80.7 kg) 31.53 kg/m2 Never Smoker Vitals History BMI and BSA Data Body Mass Index Body Surface Area  
 31.53 kg/m 2 1.89 m 2 Preferred Pharmacy Pharmacy Name Phone [facility] [address], [address] [phone] Your Updated Medication List  
  
   
This list is accurate as of 7/27/18 10:19 AM.  Always use your most recent med list.  
  
  
  
  
 aspirin 81 mg tablet Take 81 mg by mouth daily. atorvastatin 10 mg tablet Commonly known as:  LIPITOR  
take 1 tablet by mouth once daily  
  
 calcium 500 mg Tab Take  by mouth daily. cephALEXin 500 mg capsule Commonly known as:  KEFLEX  
  
 cholecalciferol 1,000 unit Cap Commonly known as:  VITAMIN D3 Take  by mouth daily. cyanocobalamin 1,000 mcg tablet Take 1,000 mcg by mouth daily. FEMARA 2.5 mg tablet Generic drug:  letrozole Take 2.5 mg by mouth daily. IBUPROFEN PO Take  by mouth as needed. metoprolol succinate 50 mg XL tablet Commonly known as:  TOPROL-XL  
take 1 tablet by mouth once daily  
  
 multivitamins with minerals Cap Take  by mouth daily. nitroglycerin 0.4 mg SL tablet Commonly known as:  NITROSTAT  
1 Tab by SubLINGual route every five (5) minutes as needed for up to 3 doses. olmesartan-hydroCHLOROthiazide 40-12.5 mg per tablet Commonly known as:  BENICAR HCT Take 1 Tab by mouth daily. omega 3-DHA-EPA-fish oil 1,000 mg (120 mg-180 mg) capsule Take  by mouth daily. TYLENOL PO Take  by mouth as needed. valACYclovir 500 mg tablet Commonly known as:  VALTREX  
1,000 mg.  
  
 ZyrTEC 10 mg Cap Generic drug:  Cetirizine Take 10 mg by mouth as needed. Prescriptions Sent to Pharmacy Refills olmesartan-hydroCHLOROthiazide (BENICAR HCT) 40-12.5 mg per tablet 6 Sig: Take 1 Tab by mouth daily. Class: Normal  
 Pharmacy: [facility] [place] [id number] [address] #: [phone] Route: Oral  
  
Follow-up Instructions Return in about 6 months (around 1/27/2019). Introducing [facility]! [employer] introduces [employer] patient portal. Now you can access parts of your medical record, email your doctor's office, and request medication refills online. 1. In your internet browser, go to [email or web address] 2. Click on the First Time User? Click Here link in the Sign In box. You will see the New Member Sign Up page. 3. Enter your [employer] Access Code exactly as it appears below. You will not need to use this code after youve completed the sign-up process. If you do not sign up before the expiration date, you must request a new code. · [employer] Access Code: [email or web address] Expires: 10/25/2018 10:05 AM 
 
4. Enter the last four digits of your Social Security Number (xxxx) and Date of Birth (mm/dd/yyyy) as indicated and click Submit. You will be taken to the next sign-up page. 5. Create a [employer] ID. This will be your [employer] login ID and cannot be changed, so think of one that is secure and easy to remember. 6. Create a [employer] password. You can change your password at any time. 7. Enter your Password Reset Question and Answer. This can be used at a later time if you forget your password. 8. Enter your e-mail address. You will receive e-mail notification when new information is available in [address] 9. Click Sign Up. You can now view and download portions of your medical record. 10. Click the Download Summary menu link to download a portable copy of your medical information. If you have questions, please visit the Frequently Asked Questions section of the [employer] website.  Remember, [employer] is NOT to be used for urgent needs. For medical emergencies, dial 911. Now available from your iPhone and Android! Please provide this summary of care documentation to your next provider. Your primary care clinician is listed as [facility]. If you have any questions after today's visit, please call [phone].

## 2018-07-27 NOTE — PROGRESS NOTES
1. Have you been to the ER, urgent care clinic since your last visit? Hospitalized since your last visit? 
 
 no 
 
2. Have you seen or consulted any other health care providers outside of the [address] [place] [address] since your last visit? Include any pap smears or colon screening. Yes Where: pcp 3. Since your last visit, have you had any of the following symptoms? shortness of breath.

## 2018-07-27 NOTE — PROGRESS NOTES
HISTORY OF PRESENT ILLNESS Cullen Huntley is a 81 y.o. female. CHF The history is provided by the patient. This is a chronic problem. The problem has not changed since onset. Associated symptoms include shortness of breath. Pertinent negatives include no chest pain, no abdominal pain and no headaches. The symptoms are aggravated by exertion. Valvular Heart Disease The history is provided by the patient. This is a chronic problem. The problem occurs constantly. The problem has been gradually improving. Associated symptoms include shortness of breath. Pertinent negatives include no chest pain, no abdominal pain and no headaches. Shortness of Breath The history is provided by the patient. The problem occurs intermittently. The problem has been gradually improving. Pertinent negatives include no fever, no headaches, no cough, no sputum production, no hemoptysis, no wheezing, no PND, no orthopnea, no chest pain, no vomiting, no abdominal pain, no rash, no leg swelling and no claudication. The problem's precipitants include exercise (exertion). Review of Systems Constitutional: Negative for chills and fever. HENT: Negative for nosebleeds. Eyes: Negative for blurred vision and double vision. Respiratory: Positive for shortness of breath. Negative for cough, hemoptysis, sputum production and wheezing. Cardiovascular: Negative for chest pain, palpitations, orthopnea, claudication, leg swelling and PND. Gastrointestinal: Negative for abdominal pain, heartburn, nausea and vomiting. Musculoskeletal: Negative for myalgias. Skin: Negative for rash. Neurological: Negative for dizziness, weakness and headaches. Endo/Heme/Allergies: Does not bruise/bleed easily. Family History Problem Relation Age of Onset  Cancer Mother   
  colon  Heart Disease Father  Breast Cancer Sister  Cancer Sister 36 Breast  
 Diabetes Other   
  parent Past Medical History: Diagnosis Date  [facility]  Cancer ([facility] renal  
 Cancer ([facility] Right breast  
 Chronic diastolic heart failure (HCC) 8/15/2014  
 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  
Procedure Laterality Date  HX HYSTERECTOMY  HX MASTECTOMY  2012 Partial right  HX NEPHRECTOMY  2002  
 right  BERTIN BIOPSY BREAST STEREOTACTIC Social History Substance Use Topics  Smoking status: Never Smoker  Smokeless tobacco: Never Used  Alcohol use Yes Comment: occasinally Allergies Allergen Reactions  Erythromycin Not Reported This Time Current Outpatient Prescriptions Medication Sig  
 olmesartan-hydroCHLOROthiazide (BENICAR HCT) 40-12.5 mg per tablet Take 1 Tab by mouth daily.  metoprolol succinate (TOPROL-XL) 50 mg XL tablet take 1 tablet by mouth once daily  atorvastatin (LIPITOR) 10 mg tablet take 1 tablet by mouth once daily  nitroglycerin (NITROSTAT) 0.4 mg SL tablet 1 Tab by SubLINGual route every five (5) minutes as needed for up to 3 doses.  cyanocobalamin 1,000 mcg tablet Take 1,000 mcg by mouth daily.  cholecalciferol (VITAMIN D3) 1,000 unit cap Take  by mouth daily.  cephALEXin (KEFLEX) 500 mg capsule  valACYclovir (VALTREX) 500 mg tablet 1,000 mg.  calcium 500 mg Tab Take  by mouth daily.  Omega-3-DHA-EPA-Fish Oil 1,000 (120-180) mg cap Take  by mouth daily.  Cetirizine (ZYRTEC) 10 mg cap Take 10 mg by mouth as needed.   
 aspirin 81 mg tablet Take 81 mg by mouth daily.  
 multivitamins with minerals Cap Take  by mouth daily.  letrozole (FEMARA) 2.5 mg tablet Take 2.5 mg by mouth daily.  ACETAMINOPHEN (TYLENOL PO) Take  by mouth as needed.  IBUPROFEN PO Take  by mouth as needed. No current facility-administered medications for this visit. Visit Vitals  /66  Pulse 68  Ht 5' 3\" (1.6 m)  Wt 80.7 kg (178 lb)  BMI 31.53 kg/m2 Physical Exam  
Constitutional: She is oriented to person, place, and time. She appears well-developed and well-nourished. HENT:  
Head: Normocephalic and atraumatic. Eyes: Conjunctivae are normal.  
Neck: Neck supple. No JVD present. No tracheal deviation present. No thyromegaly present. Cardiovascular: Normal rate and regular rhythm. PMI is not displaced. Exam reveals no gallop and no decreased pulses. Murmur heard. Early systolic murmur is present with a grade of 2/6  at the upper right sternal border Pulmonary/Chest: No respiratory distress. She has no wheezes. She has no rales. She exhibits no tenderness. Abdominal: Soft. There is no tenderness. Musculoskeletal: She exhibits no edema. Neurological: She is alert and oriented to person, place, and time. Skin: Skin is warm. Psychiatric: She has a normal mood and affect. Ms. Delaney Avila has a reminder for a \"due or due soon\" health maintenance. I have asked that she contact her primary care provider for follow-up on this health maintenance. CARDIOLOGY STUDIES 1/30/2013 Myocardial Perfusion Scan Result 3/07 mild ant wall defect with partial reversibility;  
Echocardiogram - Complete Result 3/10 NORMAL EF,MILD MR,MILD TR;  
  
  
 
 
Assessment ICD-10-CM ICD-9-CM 1. Chronic diastolic heart failure (HCC) I50.32 428.32 Stable. Compensated with [place] heart classification 2  
2. 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 Asymptomatic 3.  Atherosclerosis of native coronary artery of native heart without angina pectoris I25.10 414.01 Stable. No angina 4. Essential hypertension, benign I10 401.1 Stable continue medical management 5. Hyperlipidemia, unspecified hyperlipidemia type E78.5 272.4 On statin therapy. Lab with PCP Medications Discontinued During This Encounter Medication Reason  valsartan-hydroCHLOROthiazide (DIOVAN-HCT) 320-12.5 mg per tablet Other Orders Placed This Encounter  olmesartan-hydroCHLOROthiazide (BENICAR HCT) 40-12.5 mg per tablet Sig: Take 1 Tab by mouth daily. Dispense:  30 Tab Refill:  6 Follow-up Disposition: 
Return in about 6 months (around 1/27/2019).

## 2018-10-18 ENCOUNTER — HOSPITAL ENCOUNTER (OUTPATIENT)
Dept: ULTRASOUND IMAGING | Age: 83
Discharge: HOME | End: 2018-10-18
Attending: PHYSICIAN ASSISTANT
Payer: MEDICARE

## 2018-10-18 ENCOUNTER — HOSPITAL ENCOUNTER (OUTPATIENT)
Dept: MAMMOGRAPHY | Age: 83
Discharge: HOME | End: 2018-10-18
Attending: PHYSICIAN ASSISTANT
Payer: MEDICARE

## 2018-10-18 DIAGNOSIS — C50.411 MALIGNANT NEOPLASM OF UPPER-OUTER QUADRANT OF RIGHT FEMALE BREAST (HCC): ICD-10-CM

## 2018-10-18 PROCEDURE — 76642 ULTRASOUND BREAST LIMITED: CPT

## 2018-10-18 PROCEDURE — 77061 BREAST TOMOSYNTHESIS UNI: CPT

## 2018-10-22 ENCOUNTER — HOSPITAL ENCOUNTER (OUTPATIENT)
Dept: BONE DENSITY | Age: 83
Discharge: HOME | End: 2018-10-22
Attending: PHYSICIAN ASSISTANT
Payer: MEDICARE

## 2018-10-22 DIAGNOSIS — M81.8 IDIOPATHIC OSTEOPOROSIS: ICD-10-CM

## 2018-10-22 PROCEDURE — 77080 DXA BONE DENSITY AXIAL: CPT

## 2018-11-12 RX ORDER — ATORVASTATIN CALCIUM 10 MG/1
TABLET, FILM COATED ORAL
Qty: 30 TAB | Refills: 6 | Status: SHIPPED | OUTPATIENT
Start: 2018-11-12 | End: 2019-02-07 | Stop reason: SDUPTHER

## 2018-11-13 ENCOUNTER — APPOINTMENT (OUTPATIENT)
Dept: GENERAL RADIOLOGY | Age: 83
End: 2018-11-13
Attending: EMERGENCY MEDICINE
Payer: MEDICARE

## 2018-11-13 ENCOUNTER — HOSPITAL ENCOUNTER (EMERGENCY)
Age: 83
Discharge: HOME | End: 2018-11-13
Attending: EMERGENCY MEDICINE
Payer: MEDICARE

## 2018-11-13 VITALS
OXYGEN SATURATION: 97 % | RESPIRATION RATE: 20 BRPM | HEIGHT: 63 IN | SYSTOLIC BLOOD PRESSURE: 149 MMHG | WEIGHT: 193 LBS | HEART RATE: 65 BPM | TEMPERATURE: 98.8 F | DIASTOLIC BLOOD PRESSURE: 69 MMHG | BODY MASS INDEX: 34.2 KG/M2

## 2018-11-13 DIAGNOSIS — J06.9 ACUTE UPPER RESPIRATORY INFECTION: Primary | ICD-10-CM

## 2018-11-13 PROCEDURE — 71046 X-RAY EXAM CHEST 2 VIEWS: CPT

## 2018-11-13 PROCEDURE — 74011250637 HC RX REV CODE- 250/637: Performed by: EMERGENCY MEDICINE

## 2018-11-13 PROCEDURE — 99283 EMERGENCY DEPT VISIT LOW MDM: CPT

## 2018-11-13 PROCEDURE — A9270 NON-COVERED ITEM OR SERVICE: HCPCS | Performed by: EMERGENCY MEDICINE

## 2018-11-13 PROCEDURE — 74011000250 HC RX REV CODE- 250: Performed by: EMERGENCY MEDICINE

## 2018-11-13 PROCEDURE — 77030029684 HC NEB SM VOL KT MONA -A

## 2018-11-13 PROCEDURE — 74011636637 HC RX REV CODE- 636/637: Performed by: EMERGENCY MEDICINE

## 2018-11-13 PROCEDURE — 94640 AIRWAY INHALATION TREATMENT: CPT

## 2018-11-13 RX ORDER — ALBUTEROL SULFATE 90 UG/1
2 AEROSOL, METERED RESPIRATORY (INHALATION)
Status: COMPLETED | OUTPATIENT
Start: 2018-11-13 | End: 2018-11-13

## 2018-11-13 RX ORDER — PREDNISONE 20 MG/1
60 TABLET ORAL
Status: COMPLETED | OUTPATIENT
Start: 2018-11-13 | End: 2018-11-13

## 2018-11-13 RX ORDER — PREDNISONE 20 MG/1
60 TABLET ORAL DAILY
Qty: 12 TAB | Refills: 0 | Status: SHIPPED | OUTPATIENT
Start: 2018-11-13 | End: 2018-11-17

## 2018-11-13 RX ORDER — ATORVASTATIN CALCIUM 10 MG/1
TABLET, FILM COATED ORAL
Qty: 30 TAB | Refills: 6 | Status: SHIPPED | OUTPATIENT
Start: 2018-11-13 | End: 2019-02-07 | Stop reason: SDUPTHER

## 2018-11-13 RX ORDER — IPRATROPIUM BROMIDE AND ALBUTEROL SULFATE 2.5; .5 MG/3ML; MG/3ML
3 SOLUTION RESPIRATORY (INHALATION)
Status: COMPLETED | OUTPATIENT
Start: 2018-11-13 | End: 2018-11-13

## 2018-11-13 RX ORDER — BENZONATATE 100 MG/1
100 CAPSULE ORAL
Qty: 30 CAP | Refills: 0 | Status: SHIPPED | OUTPATIENT
Start: 2018-11-13 | End: 2018-11-20

## 2018-11-13 RX ADMIN — IPRATROPIUM BROMIDE AND ALBUTEROL SULFATE 3 ML: .5; 3 SOLUTION RESPIRATORY (INHALATION) at 03:01

## 2018-11-13 RX ADMIN — PREDNISONE 60 MG: 20 TABLET ORAL at 03:34

## 2018-11-13 RX ADMIN — ALBUTEROL SULFATE 2 PUFF: 90 AEROSOL, METERED RESPIRATORY (INHALATION) at 03:32

## 2018-11-13 NOTE — ED NOTES
I have reviewed discharge instructions with the patient. The patient verbalized understanding. Medication teaching given, to include name, dose, action, and side effects. Patient verbalized understanding of medications. Encouraged patient to voice any concerns with reassurance provided. Patient armband removed and shredded Patient Discharged in stable condition. Patient is awake, alert and oriented x 4. Lungs CTA bilaterally. Denies any SOB/Difficulty breathing, Denies wheezing.

## 2018-11-13 NOTE — DISCHARGE INSTRUCTIONS
Return for pain, fever not resolving with motrin or tylenol, any shortness of breath, vomiting, decreased fluid intake, weakness, numbness, dizziness, or any change or concerns. Upper Respiratory Infection (Cold): Care Instructions  Your Care Instructions    An upper respiratory infection, or URI, is an infection of the nose, sinuses, or throat. URIs are spread by coughs, sneezes, and direct contact. The common cold is the most frequent kind of URI. The flu and sinus infections are other kinds of URIs. Almost all URIs are caused by viruses. Antibiotics won't cure them. But you can treat most infections with home care. This may include drinking lots of fluids and taking over-the-counter pain medicine. You will probably feel better in 4 to 10 days. The doctor has checked you carefully, but problems can develop later. If you notice any problems or new symptoms, get medical treatment right away. Follow-up care is a key part of your treatment and safety. Be sure to make and go to all appointments, and call your doctor if you are having problems. It's also a good idea to know your test results and keep a list of the medicines you take. How can you care for yourself at home? · To prevent dehydration, drink plenty of fluids, enough so that your urine is light yellow or clear like water. Choose water and other caffeine-free clear liquids until you feel better. If you have kidney, heart, or liver disease and have to limit fluids, talk with your doctor before you increase the amount of fluids you drink. · Take an over-the-counter pain medicine, such as acetaminophen (Tylenol), ibuprofen (Advil, Motrin), or naproxen (Aleve). Read and follow all instructions on the label. · Before you use cough and cold medicines, check the label. These medicines may not be safe for young children or for people with certain health problems.   · Be careful when taking over-the-counter cold or flu medicines and Tylenol at the same time. Many of these medicines have acetaminophen, which is Tylenol. Read the labels to make sure that you are not taking more than the recommended dose. Too much acetaminophen (Tylenol) can be harmful. · Get plenty of rest.  · Do not smoke or allow others to smoke around you. If you need help quitting, talk to your doctor about stop-smoking programs and medicines. These can increase your chances of quitting for good. When should you call for help? Call 911 anytime you think you may need emergency care. For example, call if:    · You have severe trouble breathing.    Call your doctor now or seek immediate medical care if:    · You seem to be getting much sicker.     · You have new or worse trouble breathing.     · You have a new or higher fever.     · You have a new rash.    Watch closely for changes in your health, and be sure to contact your doctor if:    · You have a new symptom, such as a sore throat, an earache, or sinus pain.     · You cough more deeply or more often, especially if you notice more mucus or a change in the color of your mucus.     · You do not get better as expected. Where can you learn more? Go to http://neena-dawood.info/. Enter J634 in the search box to learn more about \"Upper Respiratory Infection (Cold): Care Instructions. \"  Current as of: December 6, 2017  Content Version: 11.8  © 8588-0131 [employer]. Care instructions adapted under license by [employer] (which disclaims liability or warranty for this information). If you have questions about a medical condition or this instruction, always ask your healthcare professional. [place] [address] any warranty or liability for your use of this information.

## 2018-11-13 NOTE — ED PROVIDER NOTES
Pt c/o cough, x 2 weeks, says not improving, worse when lying down tying to sleep. Given levaquin and phenergan dm 10/25/18 by pcp. Says no change. No sob. No chest pain ,declines pain anywhere. No sore throat. No leg pain or swelling. No weakness or numbness. No fever. No h/o asthma/copd or any resp dx, but feels like has been wheezing occasionally. Past Medical History:  
Diagnosis Date  [facility] 3/12  Cancer ([facility] renal  
 Cancer ([facility] Right breast  
 Chronic diastolic heart failure (HCC) 8/15/2014  
 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  
Procedure Laterality Date  HX HYSTERECTOMY  HX MASTECTOMY  2012 Partial right  HX NEPHRECTOMY  2002  
 right  BERTIN BIOPSY BREAST STEREOTACTIC Family History:  
Problem Relation Age of Onset  Cancer Mother   
     colon  Heart Disease Father  Breast Cancer Sister  Cancer Sister 36 Breast  
 Diabetes Other   
     parent  Breast Cancer Other Social History Socioeconomic History  Marital status:  Spouse name: Not on file  Number of children: Not on file  Years of education: Not on file  Highest education level: Not on file Social Needs  Financial resource strain: Not on file  Food insecurity - worry: Not on file  Food insecurity - inability: Not on file  Transportation needs - medical: Not on file  Transportation needs - non-medical: Not on file Occupational History  Not on file Tobacco Use  Smoking status: Never Smoker  Smokeless tobacco: Never Used Substance and Sexual Activity  Alcohol use: Yes Comment: occasinally  Drug use: No  
 Sexual activity: Not on file Other Topics Concern  Not on file Social History Narrative  Not on file ALLERGIES: Erythromycin Review of Systems Constitutional: Negative for fever. HENT: Negative for congestion. Respiratory: Positive for cough. Negative for shortness of breath. Cardiovascular: Negative for chest pain. Gastrointestinal: Negative for abdominal pain and vomiting. Musculoskeletal: Negative for back pain. Skin: Negative for rash. Neurological: Negative for light-headedness. All other systems reviewed and are negative. Vitals:  
 11/13/18 0240 BP: 149/69 Pulse: 65 Resp: 20 Temp: 98.8 °F (37.1 °C) SpO2: 97% Weight: 87.5 kg (193 lb) Height: 5' 3\" (1.6 m) Physical Exam  
Constitutional: She is oriented to person, place, and time. She appears well-developed. HENT:  
Head: Normocephalic. Mouth/Throat: Oropharynx is clear and moist.  
Eyes: Pupils are equal, round, and reactive to light. Neck: Normal range of motion. Cardiovascular: Regular rhythm. No murmur heard. Pulmonary/Chest: Effort normal. She has no wheezes. Abdominal: Soft. There is no tenderness. Musculoskeletal: Normal range of motion. She exhibits no tenderness. Neurological: She is alert and oriented to person, place, and time. Skin: No rash noted. Nursing note and vitals reviewed. MDM Procedures Vitals: 
Patient Vitals for the past 12 hrs: 
 Temp Pulse Resp BP SpO2  
11/13/18 0240 98.8 °F (37.1 °C) 65 20 149/69 97 % Medications ordered:  
Medications  
predniSONE (DELTASONE) tablet 60 mg (not administered)  
albuterol (PROVENTIL HFA, VENTOLIN HFA, PROAIR HFA) inhaler 2 Puff (not administered)  
albuterol-ipratropium (DUO-NEB) 2.5 MG-0.5 MG/3 ML (3 mL Nebulization Given 11/13/18 0301) Lab findings: 
No results found for this or any previous visit (from the past 12 hour(s)). X-Ray, CT or other radiology findings or impressions: 
XR CHEST PA LAT    (Results Pending)  
no change c/w prior 3/12 Progress notes, Consult notes or additional Procedure notes:  
3:28 AM pt feels much better after duoneb, dec cough. Lungs ctab. Nl sats, afebrile, just completed 10 day course of levaquin. No indication for repeat abx. Sx's not c/w cad/pe/ptx/dissection. Stable for dc and close f/u. Detailed return instructions given. No emc. Diagnosis: 1. Acute upper respiratory infection Disposition: home Follow-up Information Follow up With Specialties Details Why Contact Info Buddy León MD Internal Medicine Schedule an appointment as soon as possible for a visit in 2 days  [address] [place] [address] [phone] Medication List  
  
START taking these medications   
benzonatate 100 mg capsule Commonly known as:  TESSALON PERLES Take 1 Cap by mouth three (3) times daily as needed for Cough for up to 7 days. predniSONE 20 mg tablet Commonly known as:  Bobbetta Sprain Take 60 mg by mouth daily for 4 days. ASK your doctor about these medications   
aspirin 81 mg tablet 
  
atorvastatin 10 mg tablet Commonly known as:  LIPITOR 
take 1 tablet by mouth once daily 
  
calcium 500 mg Tab 
  
cephALEXin 500 mg capsule Commonly known as:  KEFLEX 
  
cholecalciferol 1,000 unit Cap Commonly known as:  VITAMIN D3 
  
cyanocobalamin 1,000 mcg tablet FEMARA 2.5 mg tablet Generic drug:  letrozole IBUPROFEN PO 
  
metoprolol succinate 50 mg XL tablet Commonly known as:  TOPROL-XL 
take 1 tablet by mouth once daily 
  
multivitamins with minerals Cap 
  
nitroglycerin 0.4 mg SL tablet Commonly known as:  NITROSTAT 
1 Tab by SubLINGual route every five (5) minutes as needed for up to 3 doses. olmesartan-hydroCHLOROthiazide 40-12.5 mg per tablet Commonly known as:  BENICAR HCT Take 1 Tab by mouth daily. omega 3-DHA-EPA-fish oil 1,000 mg (120 mg-180 mg) capsule TYLENOL PO 
  
valACYclovir 500 mg tablet Commonly known as:  VALTREX ZyrTEC 10 mg Cap Generic drug:  Cetirizine Where to Get Your Medications Information about where to get these medications is not yet available Ask your nurse or doctor about these medications · benzonatate 100 mg capsule · predniSONE 20 mg tablet

## 2018-12-14 ENCOUNTER — HOSPITAL ENCOUNTER (OUTPATIENT)
Dept: CT IMAGING | Age: 83
Discharge: HOME | End: 2018-12-14
Attending: INTERNAL MEDICINE
Payer: MEDICARE

## 2018-12-14 DIAGNOSIS — R05.9 COUGH: ICD-10-CM

## 2018-12-14 LAB — CREAT UR-MCNC: 1.1 MG/DL (ref 0.6–1.3)

## 2018-12-14 PROCEDURE — 74011636320 HC RX REV CODE- 636/320

## 2018-12-14 PROCEDURE — 82565 ASSAY OF CREATININE: CPT

## 2018-12-14 PROCEDURE — 71260 CT THORAX DX C+: CPT

## 2018-12-14 RX ADMIN — IOPAMIDOL 65 ML: 612 INJECTION, SOLUTION INTRAVENOUS at 19:00

## 2018-12-21 ENCOUNTER — HOSPITAL ENCOUNTER (EMERGENCY)
Age: 83
Discharge: HOME | End: 2018-12-21
Attending: EMERGENCY MEDICINE
Payer: MEDICARE

## 2018-12-21 VITALS
SYSTOLIC BLOOD PRESSURE: 108 MMHG | TEMPERATURE: 97.9 F | OXYGEN SATURATION: 97 % | HEART RATE: 61 BPM | HEIGHT: 63 IN | RESPIRATION RATE: 19 BRPM | WEIGHT: 194 LBS | BODY MASS INDEX: 34.38 KG/M2 | DIASTOLIC BLOOD PRESSURE: 72 MMHG

## 2018-12-21 DIAGNOSIS — R55 NEAR SYNCOPE: Primary | ICD-10-CM

## 2018-12-21 LAB — TROPONIN I BLD-MCNC: <0.04 NG/ML (ref 0–0.08)

## 2018-12-21 PROCEDURE — 93005 ELECTROCARDIOGRAM TRACING: CPT

## 2018-12-21 PROCEDURE — 99285 EMERGENCY DEPT VISIT HI MDM: CPT

## 2018-12-21 PROCEDURE — 84484 ASSAY OF TROPONIN QUANT: CPT

## 2018-12-21 NOTE — ED PROVIDER NOTES
EMERGENCY DEPARTMENT HISTORY AND PHYSICAL EXAM    3:33 PM      Date: 12/21/2018  Patient Name: Savita Bell    History of Presenting Illness     Chief Complaint   Patient presents with    Dizziness         History Provided By: Patient    Chief Complaint: Light-headedness  Duration: This afternoon  Timing:  Acute  Location: N/A  Quality: Near syncope  Severity: N/A  Modifying Factors: The patient does not report taking any medications for her symptoms  Associated Symptoms: Diaphoresis. Denies chest pain, diarrhea, and vomiting. Additional History (Context): Savita Bell is a 81 y.o. female presents with an acute episode of light-headedness this afternoon. The patient states she was getting her hair  done and had got up to use the restroom. While sitting on the commode she felt as if she was near syncope and diaphoretic. Denies chest pain, diarrhea, and vomiting. Reports having pneumonia 2 weeks ago and was seen by her PCP yesterday. The patient's symptoms have resolved PTA. The patient does not report taking any medications for her symptoms. PCP: Theresa Santiago MD    Current Outpatient Medications   Medication Sig Dispense Refill    atorvastatin (LIPITOR) 10 mg tablet take 1 tablet by mouth once daily 30 Tab 6    atorvastatin (LIPITOR) 10 mg tablet take 1 tablet by mouth once daily 30 Tab 6    olmesartan-hydroCHLOROthiazide (BENICAR HCT) 40-12.5 mg per tablet Take 1 Tab by mouth daily. 30 Tab 6    metoprolol succinate (TOPROL-XL) 50 mg XL tablet take 1 tablet by mouth once daily 30 Tab 6    nitroglycerin (NITROSTAT) 0.4 mg SL tablet 1 Tab by SubLINGual route every five (5) minutes as needed for up to 3 doses. 25 Tab 1    cyanocobalamin 1,000 mcg tablet Take 1,000 mcg by mouth daily.  cholecalciferol (VITAMIN D3) 1,000 unit cap Take  by mouth daily.       cephALEXin (KEFLEX) 500 mg capsule   0    valACYclovir (VALTREX) 500 mg tablet 1,000 mg.  0    calcium 500 mg Tab Take  by mouth daily.  Omega-3-DHA-EPA-Fish Oil 1,000 (120-180) mg cap Take  by mouth daily.  Cetirizine (ZYRTEC) 10 mg cap Take 10 mg by mouth as needed.  aspirin 81 mg tablet Take 81 mg by mouth daily.  multivitamins with minerals Cap Take  by mouth daily.  letrozole (FEMARA) 2.5 mg tablet Take 2.5 mg by mouth daily.  ACETAMINOPHEN (TYLENOL PO) Take  by mouth as needed.  IBUPROFEN PO Take  by mouth as needed. Past History     Past Medical History:  Past Medical History:   Diagnosis Date    Breast [facility] [address] 3/12    Cancer ([facility] [address]     renal    Cancer ([facility] [address]     Right breast    Chronic diastolic heart failure (HCC) 8/15/2014    exertional sob stable nyha class2     Colon polyps     Congestive heart failure (HCC)     Coronary atherosclerosis of native coronary artery     Essential hypertension, benign     Heart murmur     History of breast cancer     Hypertension     Mitral valve disorders(424.0)     Mitral valve regurgitation     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     Other and unspecified hyperlipidemia     Radiation therapy     Radiation therapy complication        Past Surgical History:  Past Surgical History:   Procedure Laterality Date    HX HYSTERECTOMY      HX MASTECTOMY  2012    Partial right    HX NEPHRECTOMY  2002    right    BERTIN BIOPSY BREAST STEREOTACTIC         Family History:  Family History   Problem Relation Age of Onset    Cancer Mother         colon     Heart Disease Father     Breast Cancer Sister     Cancer Sister 36        Breast    Diabetes Other         parent    Breast Cancer Other        Social History:  Social History     Tobacco Use    Smoking status: Never Smoker    Smokeless tobacco: Never Used   Substance Use Topics    Alcohol use: Yes     Comment: occasinally    Drug use: No       Allergies:   Allergies   Allergen Reactions    Erythromycin Not Reported This Time         Review of Systems     Review of Systems Constitutional: Positive for diaphoresis. Cardiovascular: Negative for chest pain. Gastrointestinal: Negative for diarrhea and vomiting. Neurological: Positive for light-headedness. All other systems reviewed and are negative. Physical Exam     Patient Vitals for the past 12 hrs:   Temp Pulse Resp BP SpO2   12/21/18 1545  63 25 115/74 96 %   12/21/18 1531 97.9 °F (36.6 °C) 66 18 120/45 96 %   12/21/18 1530  62 18 100/71 98 %         Physical Exam   Constitutional: She appears well-developed and well-nourished. HENT:   Head: Normocephalic and atraumatic. Eyes: Conjunctivae are normal. Pupils are equal, round, and reactive to light. No scleral icterus. Neck: Normal range of motion. Neck supple. No JVD present. Cardiovascular: Normal rate, regular rhythm and normal heart sounds. 4 intact extremity pulses   Pulmonary/Chest: Effort normal and breath sounds normal.   Abdominal: Soft. She exhibits no mass. There is no tenderness. Musculoskeletal: Normal range of motion. Lymphadenopathy:     She has no cervical adenopathy. Neurological: She is alert. She has normal strength. No cranial nerve deficit or sensory deficit. Coordination normal.   Skin: Skin is warm and dry. Nursing note and vitals reviewed.         Diagnostic Study Results   Labs -  Recent Results (from the past 12 hour(s))   EKG, 12 LEAD, INITIAL    Collection Time: 12/21/18  3:25 PM   Result Value Ref Range    Ventricular Rate 58 BPM    Atrial Rate 58 BPM    P-R Interval 176 ms    QRS Duration 96 ms    Q-T Interval 462 ms    QTC Calculation (Bezet) 453 ms    Calculated P Axis 25 degrees    Calculated R Axis -20 degrees    Calculated T Axis 4 degrees    Diagnosis       Sinus bradycardia  Voltage criteria for left ventricular hypertrophy  Abnormal ECG  When compared with ECG of 24-FEB-2012 10:32,  Previous ECG has undetermined rhythm, needs review  QRS axis shifted right  Non-specific change in ST segment in Lateral leads  T wave inversion less evident in Inferior leads  Nonspecific T wave abnormality now evident in Anterior leads  T wave inversion no longer evident in Lateral leads     POC TROPONIN-I    Collection Time: 12/21/18  3:43 PM   Result Value Ref Range    Troponin-I (POC) <0.04 0.00 - 0.08 ng/mL       Radiologic Studies -   No orders to display     No results found. Medications ordered:   Medications - No data to display      Medical Decision Making   Initial Medical Decision Making and DDx:  Symptoms entirely resolved. Isolated period of light-headedness. This is consistent with vasovagal near syncope. Not consistent with acute cardiac event, anemia, bowel obstruction, PE, or other alarming cause. If troponin is negative, will discharge. ED Course: Progress Notes, Reevaluation, and Consults:     3:53 PM Troponin negative. Patient will go home. 3:54 PM Pt reevaluated at this time. Discussed results and findings, as well as, diagnosis and plan for discharge. Follow up with doctors/services listed. Return to the emergency department for alarming symptoms. Pt verbalizes understanding and agreement with plan. All questions addressed. I am the first provider for this patient. I reviewed the vital signs, available nursing notes, past medical history, past surgical history, family history and social history. Vital Signs-Reviewed the patient's vital signs. Pulse Oximetry Analysis - 96% on room air    EKG: Interpreted by the EP. Time Interpreted: 15:25   Rate: 58   Rhythm: Sinus Bradycardia   Interpretation: Nothing acute    Records Reviewed: Nursing Notes and Old Medical Records (Time of Review: 3:33 PM)    Diagnosis     Clinical Impression:   1.  Near syncope        Disposition: home    Follow-up Information     Follow up With Specialties Details Why Kendall Kenyon MD Internal Medicine In 2 days  [address] [id number]  [phone]                Medication List      ASK your doctor about these medications    aspirin 81 mg tablet     * atorvastatin 10 mg tablet  Commonly known as:  LIPITOR  take 1 tablet by mouth once daily     * atorvastatin 10 mg tablet  Commonly known as:  LIPITOR  take 1 tablet by mouth once daily     calcium 500 mg Tab     cephALEXin 500 mg capsule  Commonly known as:  KEFLEX     cholecalciferol 1,000 unit Cap  Commonly known as:  VITAMIN D3     cyanocobalamin 1,000 mcg tablet     FEMARA 2.5 mg tablet  Generic drug:  letrozole     IBUPROFEN PO     metoprolol succinate 50 mg XL tablet  Commonly known as:  TOPROL-XL  take 1 tablet by mouth once daily     multivitamins with minerals Cap     nitroglycerin 0.4 mg SL tablet  Commonly known as:  NITROSTAT  1 Tab by SubLINGual route every five (5) minutes as needed for up to 3 doses. olmesartan-hydroCHLOROthiazide 40-12.5 mg per tablet  Commonly known as:  BENICAR HCT  Take 1 Tab by mouth daily. omega 3-DHA-EPA-fish oil 1,000 mg (120 mg-180 mg) capsule     TYLENOL PO     valACYclovir 500 mg tablet  Commonly known as:  VALTREX     ZyrTEC 10 mg Cap  Generic drug:  Cetirizine         * This list has 2 medication(s) that are the same as other medications prescribed for you. Read the directions carefully, and ask your doctor or other care provider to review them with you.              _______________________________    Attestations:  [place] [id number] [facility] acting as a scribe for and in the presence of Saleem Marshall MD      December 21, 2018 at 4:07 PM       Provider Attestation:      I personally performed the services described in the documentation, reviewed the documentation, as recorded by the scribe in my presence, and it accurately and completely records my words and actions.  December 21, 2018 at 4:07 PM - Saleem Marshall MD    _______________________________

## 2018-12-21 NOTE — DISCHARGE INSTRUCTIONS
Lightheadedness or Faintness: Care Instructions  Your Care Instructions  Lightheadedness is a feeling that you are about to faint or \"pass out. \" You do not feel as if you or your surroundings are moving. It is different from vertigo, which is the feeling that you or things around you are spinning or tilting. Lightheadedness usually goes away or gets better when you lie down. If lightheadedness gets worse, it can lead to a fainting spell. It is common to feel lightheaded from time to time. Lightheadedness usually is not caused by a serious problem. It often is caused by a short-lasting drop in blood pressure and blood flow to your head that occurs when you get up too quickly from a seated or lying position. Follow-up care is a key part of your treatment and safety. Be sure to make and go to all appointments, and call your doctor if you are having problems. It's also a good idea to know your test results and keep a list of the medicines you take. How can you care for yourself at home? · Lie down for 1 or 2 minutes when you feel lightheaded. After lying down, sit up slowly and remain sitting for 1 to 2 minutes before slowly standing up. · Avoid movements, positions, or activities that have made you lightheaded in the past.  · Get plenty of rest, especially if you have a cold or flu, which can cause lightheadedness. · Make sure you drink plenty of fluids, especially if you have a fever or have been sweating. · Do not drive or put yourself and others in danger while you feel lightheaded. When should you call for help? Call 911 anytime you think you may need emergency care. For example, call if:    · You have symptoms of a stroke. These may include:  ? Sudden numbness, tingling, weakness, or loss of movement in your face, arm, or leg, especially on only one side of your body. ? Sudden vision changes. ? Sudden trouble speaking. ? Sudden confusion or trouble understanding simple statements.   ? Sudden problems with walking or balance. ? A sudden, severe headache that is different from past headaches.     · You have symptoms of a heart attack. These may include:  ? Chest pain or pressure, or a strange feeling in the chest.  ? Sweating. ? Shortness of breath. ? Nausea or vomiting. ? Pain, pressure, or a strange feeling in the back, neck, jaw, or upper belly or in one or both shoulders or arms. ? Lightheadedness or sudden weakness. ? A fast or irregular heartbeat. After you call 911, the  may tell you to chew 1 adult-strength or 2 to 4 low-dose aspirin. Wait for an ambulance. Do not try to drive yourself.    Watch closely for changes in your health, and be sure to contact your doctor if:    · Your lightheadedness gets worse or does not get better with home care. Where can you learn more? Go to http://neena-dawood.info/. Enter B347 in the search box to learn more about \"Lightheadedness or Faintness: Care Instructions. \"  Current as of: November 20, 2017  Content Version: 11.8  © 3018-0210 [employer]. Care instructions adapted under license by [employer] (which disclaims liability or warranty for this information). If you have questions about a medical condition or this instruction, always ask your healthcare professional. [address] any warranty or liability for your use of this information.

## 2018-12-21 NOTE — ED NOTES
Reviewed discharge instructions with the patient and family at the bedside. Questions encouraged and answered.   Patient verbalized an understanding

## 2018-12-21 NOTE — ED TRIAGE NOTES
Per EMS, Patient had a near syncopal episode while getting her hair done. Went to the bathroom and felt dizzy. 12 lead was done and transmitted.

## 2018-12-22 LAB
ATRIAL RATE: 58 BPM
CALCULATED P AXIS, ECG09: 25 DEGREES
CALCULATED R AXIS, ECG10: -20 DEGREES
CALCULATED T AXIS, ECG11: 4 DEGREES
DIAGNOSIS, 93000: NORMAL
P-R INTERVAL, ECG05: 176 MS
Q-T INTERVAL, ECG07: 462 MS
QRS DURATION, ECG06: 96 MS
QTC CALCULATION (BEZET), ECG08: 453 MS
VENTRICULAR RATE, ECG03: 58 BPM

## 2019-01-18 RX ORDER — ATORVASTATIN CALCIUM 10 MG/1
TABLET, FILM COATED ORAL
Qty: 30 TAB | Refills: 6 | Status: SHIPPED | OUTPATIENT
Start: 2019-01-18 | End: 2019-03-11 | Stop reason: SDUPTHER

## 2019-02-07 ENCOUNTER — OFFICE VISIT (OUTPATIENT)
Dept: CARDIOLOGY CLINIC | Age: 84
End: 2019-02-07

## 2019-02-07 VITALS
HEART RATE: 78 BPM | SYSTOLIC BLOOD PRESSURE: 126 MMHG | BODY MASS INDEX: 34.73 KG/M2 | HEIGHT: 63 IN | DIASTOLIC BLOOD PRESSURE: 52 MMHG | WEIGHT: 196 LBS

## 2019-02-07 DIAGNOSIS — E78.5 HYPERLIPIDEMIA, UNSPECIFIED HYPERLIPIDEMIA TYPE: ICD-10-CM

## 2019-02-07 DIAGNOSIS — I50.32 CHRONIC DIASTOLIC HEART FAILURE (HCC): Primary | ICD-10-CM

## 2019-02-07 DIAGNOSIS — I10 ESSENTIAL HYPERTENSION, BENIGN: ICD-10-CM

## 2019-02-07 DIAGNOSIS — I08.0 MULTIPLE LESIONS OF MITRAL AND AORTIC VALVE: ICD-10-CM

## 2019-02-07 DIAGNOSIS — I25.10 ATHEROSCLEROSIS OF NATIVE CORONARY ARTERY OF NATIVE HEART WITHOUT ANGINA PECTORIS: ICD-10-CM

## 2019-02-07 RX ORDER — NITROGLYCERIN 0.4 MG/1
0.4 TABLET SUBLINGUAL
Qty: 25 TAB | Refills: 1 | Status: SHIPPED | OUTPATIENT
Start: 2019-02-07 | End: 2021-02-04 | Stop reason: SDUPTHER

## 2019-02-07 NOTE — LETTER
[id number] 1935 2/7/2019 Dear Alice Mendez MD 
 
I had the pleasure of evaluating  Ms. Vanegas in office today. Below are the relevant portions of my assessment and plan of care. ICD-10-CM ICD-9-CM 1. Chronic diastolic heart failure (HCC) I50.32 428.32 Stable continue treatment class II 2. 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 Atherosclerosis of native coronary artery of native heart without angina pectoris I25.10 414.01 Stable 4. Essential hypertension, benign I10 401.1 Stable continue treatment 5. Hyperlipidemia, unspecified hyperlipidemia type E78.5 272.4 On statin lab with PCP Current Outpatient Medications Medication Sig Dispense Refill  calcium carbonate/vitamin D3 (CALCIUM 600 WITH VITAMIN D3 PO) Take  by mouth.  nitroglycerin (NITROSTAT) 0.4 mg SL tablet 1 Tab by SubLINGual route every five (5) minutes as needed for up to 3 doses. 25 Tab 1  
 atorvastatin (LIPITOR) 10 mg tablet take 1 tablet by mouth once daily 30 Tab 6  
 olmesartan-hydroCHLOROthiazide (BENICAR HCT) 40-12.5 mg per tablet Take 1 Tab by mouth daily. 30 Tab 6  
 metoprolol succinate (TOPROL-XL) 50 mg XL tablet take 1 tablet by mouth once daily 30 Tab 6  cyanocobalamin 1,000 mcg tablet Take 1,000 mcg by mouth daily.  cholecalciferol (VITAMIN D3) 1,000 unit cap Take  by mouth daily.  valACYclovir (VALTREX) 500 mg tablet 1,000 mg.  0  
 Omega-3-DHA-EPA-Fish Oil 1,000 (120-180) mg cap Take  by mouth daily.  Cetirizine (ZYRTEC) 10 mg cap Take 10 mg by mouth as needed.  aspirin 81 mg tablet Take 81 mg by mouth daily.  multivitamins with minerals Cap Take  by mouth daily.  letrozole (FEMARA) 2.5 mg tablet Take 2.5 mg by mouth daily.  ACETAMINOPHEN (TYLENOL PO) Take  by mouth as needed.  IBUPROFEN PO Take 800 mg by mouth as needed. Orders Placed This Encounter  calcium carbonate/vitamin D3 (CALCIUM 600 WITH VITAMIN D3 PO) Sig: Take  by mouth.  nitroglycerin (NITROSTAT) 0.4 mg SL tablet Si Tab by SubLINGual route every five (5) minutes as needed for up to 3 doses. Dispense:  25 Tab Refill:  1 If you have questions, please do not hesitate to call me. I look forward to following MsGillian Guilherme along with you. Sincerely, Kia Her MD

## 2019-02-07 NOTE — PROGRESS NOTES
HISTORY OF PRESENT ILLNESS  Juliette Levi is a 81 y.o. female. CHF   The history is provided by the patient. This is a chronic problem. The problem has not changed since onset. Associated symptoms include shortness of breath. Pertinent negatives include no chest pain, no abdominal pain and no headaches. The symptoms are aggravated by exertion. Valvular Heart Disease   The history is provided by the patient. This is a chronic problem. The problem occurs constantly. The problem has been gradually improving. Associated symptoms include shortness of breath. Pertinent negatives include no chest pain, no abdominal pain and no headaches. Shortness of Breath   The history is provided by the patient. The problem occurs intermittently. The problem has been gradually improving. Pertinent negatives include no fever, no headaches, no cough, no sputum production, no hemoptysis, no wheezing, no PND, no orthopnea, no chest pain, no vomiting, no abdominal pain, no rash, no leg swelling and no claudication. The problem's precipitants include exercise (exertion). Review of Systems   Constitutional: Negative for chills and fever. HENT: Negative for nosebleeds. Eyes: Negative for blurred vision and double vision. Respiratory: Positive for shortness of breath. Negative for cough, hemoptysis, sputum production and wheezing. Cardiovascular: Negative for chest pain, palpitations, orthopnea, claudication, leg swelling and PND. Gastrointestinal: Negative for abdominal pain, heartburn, nausea and vomiting. Musculoskeletal: Negative for myalgias. Skin: Negative for rash. Neurological: Negative for dizziness, weakness and headaches. Endo/Heme/Allergies: Does not bruise/bleed easily.      Family History   Problem Relation Age of Onset    Cancer Mother         colon     Heart Disease Father     Breast Cancer Sister     Cancer Sister 36        Breast    Diabetes Other         parent    Breast Cancer Other Past Medical History:   Diagnosis Date    Breast cancer ([facility] [address] 3/12    [facility]     renal    [facility]     Right breast    [facility] 8/15/2014    exertional sob stable nyha class2     Colon polyps     Congestive heart failure (HCC)     Coronary atherosclerosis of native coronary artery     Essential hypertension, benign     Heart murmur     History of breast cancer     Hypertension     Mitral valve disorders(424.0)     Mitral valve regurgitation     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     Other and unspecified hyperlipidemia     Radiation therapy     Radiation therapy complication        Past Surgical History:   Procedure Laterality Date    HX HYSTERECTOMY      HX MASTECTOMY  2012    Partial right    HX NEPHRECTOMY  2002    right    BERTIN BIOPSY BREAST STEREOTACTIC         Social History     Tobacco Use    Smoking status: Never Smoker    Smokeless tobacco: Never Used   Substance Use Topics    Alcohol use: Yes     Frequency: Monthly or less     Drinks per session: 1 or 2     Binge frequency: Never     Comment: occasinally       Allergies   Allergen Reactions    Erythromycin Not Reported This Time       Current Outpatient Medications   Medication Sig    calcium carbonate/vitamin D3 (CALCIUM 600 WITH VITAMIN D3 PO) Take  by mouth.  nitroglycerin (NITROSTAT) 0.4 mg SL tablet 1 Tab by SubLINGual route every five (5) minutes as needed for up to 3 doses.  atorvastatin (LIPITOR) 10 mg tablet take 1 tablet by mouth once daily    olmesartan-hydroCHLOROthiazide (BENICAR HCT) 40-12.5 mg per tablet Take 1 Tab by mouth daily.  metoprolol succinate (TOPROL-XL) 50 mg XL tablet take 1 tablet by mouth once daily    cyanocobalamin 1,000 mcg tablet Take 1,000 mcg by mouth daily.  cholecalciferol (VITAMIN D3) 1,000 unit cap Take  by mouth daily.     valACYclovir (VALTREX) 500 mg tablet 1,000 mg.    Omega-3-DHA-EPA-Fish Oil 1,000 (120-180) mg cap Take by mouth daily.  Cetirizine (ZYRTEC) 10 mg cap Take 10 mg by mouth as needed.  aspirin 81 mg tablet Take 81 mg by mouth daily.  multivitamins with minerals Cap Take  by mouth daily.  letrozole (FEMARA) 2.5 mg tablet Take 2.5 mg by mouth daily.  ACETAMINOPHEN (TYLENOL PO) Take  by mouth as needed.  IBUPROFEN PO Take 800 mg by mouth as needed. No current facility-administered medications for this visit. Visit Vitals  /52   Pulse 78   Ht 5' 3\" (1.6 m)   Wt 88.9 kg (196 lb)   BMI 34.72 kg/m²         Physical Exam   Constitutional: She is oriented to person, place, and time. She appears well-developed and well-nourished. HENT:   Head: Normocephalic and atraumatic. Eyes: Conjunctivae are normal.   Neck: Neck supple. No JVD present. No tracheal deviation present. No thyromegaly present. Cardiovascular: Normal rate and regular rhythm. PMI is not displaced. Exam reveals no gallop and no decreased pulses. Murmur heard. Early systolic murmur is present with a grade of 2/6 at the upper right sternal border. Pulmonary/Chest: No respiratory distress. She has no wheezes. She has no rales. She exhibits no tenderness. Abdominal: Soft. There is no tenderness. Musculoskeletal: She exhibits no edema. Neurological: She is alert and oriented to person, place, and time. Skin: Skin is warm. Psychiatric: She has a normal mood and affect. Ms. Pauline Boudreaux has a reminder for a \"due or due soon\" health maintenance. I have asked that she contact her primary care provider for follow-up on this health maintenance. CARDIOLOGY STUDIES 1/30/2013   Myocardial Perfusion Scan Result 3/07 mild ant wall defect with partial reversibility;   Echocardiogram - Complete Result 3/10 NORMAL EF,MILD MR,MILD TR;    SUMMARY:2016  Left ventricle: The cavity was small. Systolic function was normal.  Ejection fraction was estimated to be 65 %.  No obvious wall motion  abnormalities identified in the views obtained. There was mild concentric  hypertrophy. Doppler parameters were consistent with abnormal left  ventricular relaxation (grade 1 diastolic dysfunction). Mitral valve: There was trivial regurgitation. Tricuspid valve: Tricuspid regurgitation peak velocity: 2 m/sec. Pulmonary  artery systolic pressure: 19 mmHg.            Assessment         ICD-10-CM ICD-9-CM    1. Chronic diastolic heart failure (HCC) I50.32 428.32     Stable continue treatment class II   2. 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 Atherosclerosis of native coronary artery of native heart without angina pectoris I25.10 414.01     Stable   4. Essential hypertension, benign I10 401.1     Stable continue treatment   5. Hyperlipidemia, unspecified hyperlipidemia type E78.5 272.4     On statin lab with PCP       Medications Discontinued During This Encounter   Medication Reason    atorvastatin (LIPITOR) 10 mg tablet Duplicate Order    atorvastatin (LIPITOR) 10 mg tablet Duplicate Order    nitroglycerin (NITROSTAT) 0.4 mg SL tablet Reorder    cephALEXin (KEFLEX) 500 mg capsule Not A Current Medication    calcium 500 mg Tab Not A Current Medication       Orders Placed This Encounter    nitroglycerin (NITROSTAT) 0.4 mg SL tablet     Si Tab by SubLINGual route every five (5) minutes as needed for up to 3 doses. Dispense:  25 Tab     Refill:  1       Follow-up Disposition:  Return in about 6 months (around 2019).

## 2019-02-11 RX ORDER — OLMESARTAN MEDOXOMIL AND HYDROCHLOROTHIAZIDE 40/12.5 40; 12.5 MG/1; MG/1
TABLET ORAL
Qty: 30 TAB | Refills: 6 | Status: SHIPPED | OUTPATIENT
Start: 2019-02-11 | End: 2019-03-11 | Stop reason: SDUPTHER

## 2019-03-07 ENCOUNTER — HOSPITAL ENCOUNTER (OUTPATIENT)
Dept: MAMMOGRAPHY | Age: 84
Discharge: HOME | End: 2019-03-07
Attending: INTERNAL MEDICINE
Payer: MEDICARE

## 2019-03-07 DIAGNOSIS — C50.411 MALIGNANT NEOPLASM OF UPPER-OUTER QUADRANT OF RIGHT FEMALE BREAST (HCC): ICD-10-CM

## 2019-03-07 PROCEDURE — 77062 BREAST TOMOSYNTHESIS BI: CPT

## 2019-03-11 RX ORDER — ATORVASTATIN CALCIUM 10 MG/1
TABLET, FILM COATED ORAL
Qty: 90 TAB | Refills: 0 | Status: SHIPPED | OUTPATIENT
Start: 2019-03-11 | End: 2019-06-20 | Stop reason: SDUPTHER

## 2019-03-11 RX ORDER — METOPROLOL SUCCINATE 50 MG/1
TABLET, EXTENDED RELEASE ORAL
Qty: 90 TAB | Refills: 0 | Status: SHIPPED | OUTPATIENT
Start: 2019-03-11 | End: 2019-06-20 | Stop reason: SDUPTHER

## 2019-03-11 RX ORDER — OLMESARTAN MEDOXOMIL AND HYDROCHLOROTHIAZIDE 40/12.5 40; 12.5 MG/1; MG/1
TABLET ORAL
Qty: 90 TAB | Refills: 0 | Status: SHIPPED | OUTPATIENT
Start: 2019-03-11 | End: 2019-08-16 | Stop reason: SDUPTHER

## 2019-06-20 RX ORDER — METOPROLOL SUCCINATE 50 MG/1
TABLET, EXTENDED RELEASE ORAL
Qty: 90 TAB | Refills: 0 | Status: SHIPPED | OUTPATIENT
Start: 2019-06-20 | End: 2019-09-25 | Stop reason: SDUPTHER

## 2019-06-21 RX ORDER — ATORVASTATIN CALCIUM 10 MG/1
TABLET, FILM COATED ORAL
Qty: 90 TAB | Refills: 0 | Status: SHIPPED | OUTPATIENT
Start: 2019-06-21 | End: 2019-09-20 | Stop reason: SDUPTHER

## 2019-08-08 ENCOUNTER — OFFICE VISIT (OUTPATIENT)
Dept: CARDIOLOGY CLINIC | Age: 84
End: 2019-08-08

## 2019-08-08 VITALS
BODY MASS INDEX: 34.55 KG/M2 | HEIGHT: 63 IN | HEART RATE: 69 BPM | DIASTOLIC BLOOD PRESSURE: 65 MMHG | WEIGHT: 195 LBS | SYSTOLIC BLOOD PRESSURE: 140 MMHG

## 2019-08-08 DIAGNOSIS — I50.32 CHRONIC DIASTOLIC HEART FAILURE (HCC): Primary | ICD-10-CM

## 2019-08-08 DIAGNOSIS — E78.5 HYPERLIPIDEMIA, UNSPECIFIED HYPERLIPIDEMIA TYPE: ICD-10-CM

## 2019-08-08 DIAGNOSIS — I25.10 ATHEROSCLEROSIS OF NATIVE CORONARY ARTERY OF NATIVE HEART WITHOUT ANGINA PECTORIS: ICD-10-CM

## 2019-08-08 DIAGNOSIS — I10 ESSENTIAL HYPERTENSION, BENIGN: ICD-10-CM

## 2019-08-08 DIAGNOSIS — I08.0 MULTIPLE LESIONS OF MITRAL AND AORTIC VALVE: ICD-10-CM

## 2019-08-08 NOTE — PROGRESS NOTES
1. Have you been to the ER, urgent care clinic since your last visit? Hospitalized since your last visit? No    2. Have you seen or consulted any other health care providers outside of the [address] [place] [address] since your last visit? Include any pap smears or colon screening.  No

## 2019-08-08 NOTE — PROGRESS NOTES
HISTORY OF PRESENT ILLNESS  Jeramie Jaramillo is a 81 y.o. female. CHF   The history is provided by the patient. This is a chronic problem. The problem has not changed since onset. Associated symptoms include shortness of breath. Pertinent negatives include no chest pain, no abdominal pain and no headaches. The symptoms are aggravated by exertion. Valvular Heart Disease   The history is provided by the patient. This is a chronic problem. The problem occurs constantly. The problem has been gradually improving. Associated symptoms include shortness of breath. Pertinent negatives include no chest pain, no abdominal pain and no headaches. Shortness of Breath   The history is provided by the patient. The problem occurs intermittently. The problem has been gradually improving. Pertinent negatives include no fever, no headaches, no cough, no sputum production, no hemoptysis, no wheezing, no PND, no orthopnea, no chest pain, no vomiting, no abdominal pain, no rash, no leg swelling and no claudication. The problem's precipitants include exercise (exertion). Review of Systems   Constitutional: Negative for chills and fever. HENT: Negative for nosebleeds. Eyes: Negative for blurred vision and double vision. Respiratory: Positive for shortness of breath. Negative for cough, hemoptysis, sputum production and wheezing. Cardiovascular: Negative for chest pain, palpitations, orthopnea, claudication, leg swelling and PND. Gastrointestinal: Negative for abdominal pain, heartburn, nausea and vomiting. Musculoskeletal: Negative for myalgias. Skin: Negative for rash. Neurological: Negative for dizziness, weakness and headaches. Endo/Heme/Allergies: Does not bruise/bleed easily.      Family History   Problem Relation Age of Onset    Cancer Mother         colon     Heart Disease Father     Breast Cancer Sister     Cancer Sister 36        Breast    Diabetes Other         parent    Breast Cancer Other Past Medical History:   Diagnosis Date    [facility] 3/12    [facility]     [facility]    [facility]     Right breast    Chronic diastolic heart [facility] ([facility] 8/15/2014    exertional sob stable nyha class2     Colon polyps     [facility]     Coronary atherosclerosis of native coronary artery     Essential hypertension, benign     Heart murmur     History of breast cancer     Hypertension     Mitral valve disorders(424.0)     Mitral valve regurgitation     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     Other and unspecified hyperlipidemia     Radiation therapy     Radiation therapy complication        Past Surgical History:   Procedure Laterality Date    HX HYSTERECTOMY      HX MASTECTOMY  2012    Partial right    HX NEPHRECTOMY  2002    right    BERTIN BIOPSY BREAST STEREOTACTIC         Social History     Tobacco Use    Smoking status: Never Smoker    Smokeless tobacco: Never Used   Substance Use Topics    Alcohol use: Yes     Frequency: Monthly or less     Drinks per session: 1 or 2     Binge frequency: Never     Comment: occasinally       Allergies   Allergen Reactions    Erythromycin Not Reported This Time       Current Outpatient Medications   Medication Sig    atorvastatin (LIPITOR) 10 mg tablet take 1 tablet by mouth once daily    metoprolol succinate (TOPROL-XL) 50 mg XL tablet take 1 tablet by mouth once daily    olmesartan-hydroCHLOROthiazide (BENICAR HCT) 40-12.5 mg per tablet take 1 tablet by mouth once daily -REQUESTING TO CONVERT FROM 30 TO 90 DAY SUPPLY    calcium carbonate/vitamin D3 (CALCIUM 600 WITH VITAMIN D3 PO) Take  by mouth.  nitroglycerin (NITROSTAT) 0.4 mg SL tablet 1 Tab by SubLINGual route every five (5) minutes as needed for up to 3 doses.  cyanocobalamin 1,000 mcg tablet Take 1,000 mcg by mouth daily.  cholecalciferol (VITAMIN D3) 1,000 unit cap Take  by mouth daily.  valACYclovir (VALTREX) 500 mg tablet 1,000 mg.  Omega-3-DHA-EPA-Fish Oil 1,000 (120-180) mg cap Take  by mouth daily.  Cetirizine (ZYRTEC) 10 mg cap Take 10 mg by mouth as needed.  aspirin 81 mg tablet Take 81 mg by mouth daily.  multivitamins with minerals Cap Take  by mouth daily.  letrozole (FEMARA) 2.5 mg tablet Take 2.5 mg by mouth daily.  ACETAMINOPHEN (TYLENOL PO) Take  by mouth as needed.  IBUPROFEN PO Take 800 mg by mouth as needed. No current facility-administered medications for this visit. Visit Vitals  /65   Pulse 69   Ht 5' 3\" (1.6 m)   Wt 88.5 kg (195 lb)   BMI 34.54 kg/m²         Physical Exam   Constitutional: She is oriented to person, place, and time. She appears well-developed and well-nourished. HENT:   Head: Normocephalic and atraumatic. Eyes: Conjunctivae are normal.   Neck: Neck supple. No JVD present. No tracheal deviation present. No thyromegaly present. Cardiovascular: Normal rate and regular rhythm. PMI is not displaced. Exam reveals no gallop and no decreased pulses. Murmur heard. Early systolic murmur is present with a grade of 2/6 at the upper right sternal border. Pulmonary/Chest: No respiratory distress. She has no wheezes. She has no rales. She exhibits no tenderness. Abdominal: Soft. There is no tenderness. Musculoskeletal: She exhibits no edema. Neurological: She is alert and oriented to person, place, and time. Skin: Skin is warm. Psychiatric: She has a normal mood and affect. Ms. Karla Denny has a reminder for a \"due or due soon\" health maintenance. I have asked that she contact her primary care provider for follow-up on this health maintenance. CARDIOLOGY STUDIES 1/30/2013   Myocardial Perfusion Scan Result 3/07 mild ant wall defect with partial reversibility;   Echocardiogram - Complete Result 3/10 NORMAL EF,MILD MR,MILD TR;    SUMMARY:2016  Left ventricle: The cavity was small. Systolic function was normal.  Ejection fraction was estimated to be 65 %.  No obvious wall motion  abnormalities identified in the views obtained. There was mild concentric  hypertrophy. Doppler parameters were consistent with abnormal left  ventricular relaxation (grade 1 diastolic dysfunction). Mitral valve: There was trivial regurgitation. Tricuspid valve: Tricuspid regurgitation peak velocity: 2 m/sec. Pulmonary  artery systolic pressure: 19 mmHg.            Assessment         ICD-10-CM ICD-9-CM    1. Chronic diastolic heart failure (HCC) I50.32 428.32     Stable compensated continue current treatment class II   2. 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 Atherosclerosis of native coronary artery of native heart without angina pectoris I25.10 414.01     Stable continue current medical management   4. Essential hypertension, benign I10 401.1     Stable on treatment   5. Hyperlipidemia, unspecified hyperlipidemia type E78.5 272.4     Continue treatment lab with PCP   8/2019  Cardiac status stable continue current treatment. Lab with PCP    There are no discontinued medications. No orders of the defined types were placed in this encounter. Follow-up and Dispositions    · Return in about 6 months (around 2/8/2020).

## 2019-08-16 RX ORDER — OLMESARTAN MEDOXOMIL AND HYDROCHLOROTHIAZIDE 40/12.5 40; 12.5 MG/1; MG/1
1 TABLET ORAL DAILY
Qty: 90 TAB | Refills: 1 | Status: SHIPPED | OUTPATIENT
Start: 2019-08-16 | End: 2020-06-02 | Stop reason: SDUPTHER

## 2019-09-20 RX ORDER — ATORVASTATIN CALCIUM 10 MG/1
TABLET, FILM COATED ORAL
Qty: 90 TAB | Refills: 0 | Status: SHIPPED | OUTPATIENT
Start: 2019-09-20 | End: 2019-12-15 | Stop reason: SDUPTHER

## 2019-09-26 RX ORDER — METOPROLOL SUCCINATE 50 MG/1
TABLET, EXTENDED RELEASE ORAL
Qty: 90 TAB | Refills: 0 | Status: SHIPPED | OUTPATIENT
Start: 2019-09-26 | End: 2019-12-17 | Stop reason: SDUPTHER

## 2019-12-16 RX ORDER — ATORVASTATIN CALCIUM 10 MG/1
TABLET, FILM COATED ORAL
Qty: 90 TAB | Refills: 0 | Status: SHIPPED | OUTPATIENT
Start: 2019-12-16 | End: 2020-03-16

## 2019-12-17 RX ORDER — METOPROLOL SUCCINATE 50 MG/1
TABLET, EXTENDED RELEASE ORAL
Qty: 90 TAB | Refills: 3 | Status: SHIPPED | OUTPATIENT
Start: 2019-12-17 | End: 2020-12-17

## 2019-12-17 NOTE — TELEPHONE ENCOUNTER
Follow up is scheduled for    February 6, 2020           Requested Prescriptions     Pending Prescriptions Disp Refills    metoprolol succinate (TOPROL-XL) 50 mg XL tablet 90 Tab 3     Sig: take 1 tablet by mouth once daily

## 2020-02-06 ENCOUNTER — OFFICE VISIT (OUTPATIENT)
Dept: CARDIOLOGY CLINIC | Age: 85
End: 2020-02-06

## 2020-02-06 VITALS
DIASTOLIC BLOOD PRESSURE: 61 MMHG | BODY MASS INDEX: 34.62 KG/M2 | HEART RATE: 65 BPM | HEIGHT: 63 IN | SYSTOLIC BLOOD PRESSURE: 147 MMHG | OXYGEN SATURATION: 95 % | TEMPERATURE: 98.3 F | WEIGHT: 195.4 LBS

## 2020-02-06 DIAGNOSIS — I10 ESSENTIAL HYPERTENSION, BENIGN: ICD-10-CM

## 2020-02-06 DIAGNOSIS — I25.10 ATHEROSCLEROSIS OF NATIVE CORONARY ARTERY OF NATIVE HEART WITHOUT ANGINA PECTORIS: Primary | ICD-10-CM

## 2020-02-06 DIAGNOSIS — I08.0 MULTIPLE LESIONS OF MITRAL AND AORTIC VALVE: ICD-10-CM

## 2020-02-06 DIAGNOSIS — E78.5 HYPERLIPIDEMIA, UNSPECIFIED HYPERLIPIDEMIA TYPE: ICD-10-CM

## 2020-02-06 DIAGNOSIS — I50.32 CHRONIC DIASTOLIC HEART FAILURE (HCC): ICD-10-CM

## 2020-02-06 RX ORDER — VALSARTAN AND HYDROCHLOROTHIAZIDE 320; 12.5 MG/1; MG/1
1 TABLET, FILM COATED ORAL DAILY
COMMUNITY
End: 2020-06-02

## 2020-02-06 NOTE — PROGRESS NOTES
1. Have you been to the ER, urgent care clinic since your last visit? Hospitalized since your last visit? No    2. Have you seen or consulted any other health care providers outside of the [address] [place] [address] since your last visit? Include any pap smears or colon screening.  Yes Where: PCP/Oncology Reason for visit: Routine Visits

## 2020-02-06 NOTE — PROGRESS NOTES
HISTORY OF PRESENT ILLNESS  Aura Marcelino is a 81 y.o. female. CHF   The history is provided by the patient. This is a chronic problem. The problem has not changed since onset. Associated symptoms include shortness of breath. Pertinent negatives include no chest pain, no abdominal pain and no headaches. The symptoms are aggravated by exertion. Valvular Heart Disease   The history is provided by the patient. This is a chronic problem. The problem occurs constantly. The problem has been gradually improving. Associated symptoms include shortness of breath. Pertinent negatives include no chest pain, no abdominal pain and no headaches. Shortness of Breath   The history is provided by the patient. The problem occurs intermittently. The problem has been gradually improving. Pertinent negatives include no fever, no headaches, no cough, no sputum production, no hemoptysis, no wheezing, no PND, no orthopnea, no chest pain, no vomiting, no abdominal pain, no rash, no leg swelling and no claudication. The problem's precipitants include exercise (exertion). Review of Systems   Constitutional: Negative for chills and fever. HENT: Negative for nosebleeds. Eyes: Negative for blurred vision and double vision. Respiratory: Positive for shortness of breath. Negative for cough, hemoptysis, sputum production and wheezing. Cardiovascular: Negative for chest pain, palpitations, orthopnea, claudication, leg swelling and PND. Gastrointestinal: Negative for abdominal pain, heartburn, nausea and vomiting. Musculoskeletal: Negative for myalgias. Skin: Negative for rash. Neurological: Negative for dizziness, weakness and headaches. Endo/Heme/Allergies: Does not bruise/bleed easily.      Family History   Problem Relation Age of Onset    Cancer Mother         colon     Heart Disease Father     Breast Cancer Sister     Cancer Sister 36        Breast    Diabetes Other         parent    Breast Cancer Other Past Medical History:   Diagnosis Date    [facility] ([facility] 3/12    [facility]     [facility]    [facility]     Right breast    [facility] ([facility] 8/15/2014    exertional sob stable nyha class2     Colon polyps     Congestive heart failure (HCC)     Coronary atherosclerosis of native coronary artery     Essential hypertension, benign     Heart murmur     History of breast cancer     Hypertension     Mitral valve disorders(424.0)     Mitral valve regurgitation     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     Other and unspecified hyperlipidemia     Radiation therapy     Radiation therapy complication        Past Surgical History:   Procedure Laterality Date    HX HYSTERECTOMY      HX MASTECTOMY  2012    Partial right    HX NEPHRECTOMY  2002    right    BERTIN BIOPSY BREAST STEREOTACTIC         Social History     Tobacco Use    Smoking status: Never Smoker    Smokeless tobacco: Never Used   Substance Use Topics    Alcohol use: Yes     Frequency: Monthly or less     Drinks per session: 1 or 2     Binge frequency: Never     Comment: occasinally       Allergies   Allergen Reactions    Erythromycin Not Reported This Time       Current Outpatient Medications   Medication Sig    valsartan-hydroCHLOROthiazide (DIOVAN HCT) 320-12.5 mg per tablet Take 1 Tab by mouth daily.  metoprolol succinate (TOPROL-XL) 50 mg XL tablet take 1 tablet by mouth once daily    atorvastatin (LIPITOR) 10 mg tablet take 1 tablet by mouth once daily    olmesartan-hydroCHLOROthiazide (BENICAR HCT) 40-12.5 mg per tablet Take 1 Tab by mouth daily.  calcium carbonate/vitamin D3 (CALCIUM 600 WITH VITAMIN D3 PO) Take  by mouth.  nitroglycerin (NITROSTAT) 0.4 mg SL tablet 1 Tab by SubLINGual route every five (5) minutes as needed for up to 3 doses.  cyanocobalamin 1,000 mcg tablet Take 1,000 mcg by mouth daily.  cholecalciferol (VITAMIN D3) 1,000 unit cap Take  by mouth daily.     valACYclovir (VALTREX) 500 mg tablet 1,000 mg.    Omega-3-DHA-EPA-Fish Oil 1,000 (120-180) mg cap Take  by mouth daily.  Cetirizine (ZYRTEC) 10 mg cap Take 10 mg by mouth as needed.  aspirin 81 mg tablet Take 81 mg by mouth daily.  multivitamins with minerals Cap Take  by mouth daily.  letrozole (FEMARA) 2.5 mg tablet Take 2.5 mg by mouth daily.  ACETAMINOPHEN (TYLENOL PO) Take  by mouth as needed.  IBUPROFEN PO Take 800 mg by mouth as needed. No current facility-administered medications for this visit. Visit Vitals  /61 (BP 1 Location: Left arm, BP Patient Position: Sitting)   Pulse 65   Temp 98.3 °F (36.8 °C) (Oral)   Ht 5' 3\" (1.6 m)   Wt 88.6 kg (195 lb 6.4 oz)   SpO2 95%   BMI 34.61 kg/m²         Physical Exam   Constitutional: She is oriented to person, place, and time. She appears well-developed and well-nourished. HENT:   Head: Normocephalic and atraumatic. Eyes: Conjunctivae are normal.   Neck: Neck supple. No JVD present. No tracheal deviation present. No thyromegaly present. Cardiovascular: Normal rate and regular rhythm. PMI is not displaced. Exam reveals no gallop and no decreased pulses. Murmur heard. Early systolic murmur is present with a grade of 2/6 at the upper right sternal border. Pulmonary/Chest: No respiratory distress. She has no wheezes. She has no rales. She exhibits no tenderness. Abdominal: Soft. There is no abdominal tenderness. Musculoskeletal:         General: No edema. Neurological: She is alert and oriented to person, place, and time. Skin: Skin is warm. Psychiatric: She has a normal mood and affect. Ms. Ese Kirk has a reminder for a \"due or due soon\" health maintenance. I have asked that she contact her primary care provider for follow-up on this health maintenance.     CARDIOLOGY STUDIES 1/30/2013   Myocardial Perfusion Scan Result 3/07 mild ant wall defect with partial reversibility;   Echocardiogram - Complete Result 3/10 NORMAL EF,MILD MR,MILD TR;    SUMMARY:2016  Left ventricle: The cavity was small. Systolic function was normal.  Ejection fraction was estimated to be 65 %. No obvious wall motion  abnormalities identified in the views obtained. There was mild concentric  hypertrophy. Doppler parameters were consistent with abnormal left  ventricular relaxation (grade 1 diastolic dysfunction). Mitral valve: There was trivial regurgitation. Tricuspid valve: Tricuspid regurgitation peak velocity: 2 m/sec. Pulmonary  artery systolic pressure: 19 mmHg.            Assessment         ICD-10-CM ICD-9-CM    1. Atherosclerosis of native coronary artery of native heart without angina pectoris I25.10 414.01     Stable continue current medical manage   2. Chronic diastolic heart failure (HCC) I50.32 428.32     Stable class II monitor   3. 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 Regurgitation is better on last echo   4. Essential hypertension, benign I10 401.1     Stable on treatment   5. Hyperlipidemia, unspecified hyperlipidemia type E78.5 272.4     Continue treatment lab with PCP   8/2019  Cardiac status stable continue current treatment. Lab with PCP  2/2020  Cardiac status stable. Recovering from bronchitis. Otherwise clinically stable  There are no discontinued medications. No orders of the defined types were placed in this encounter. Follow-up and Dispositions    · Return in about 6 months (around 8/6/2020).

## 2020-03-09 ENCOUNTER — HOSPITAL ENCOUNTER (OUTPATIENT)
Dept: MAMMOGRAPHY | Age: 85
Discharge: HOME | End: 2020-03-09
Attending: INTERNAL MEDICINE
Payer: MEDICARE

## 2020-03-09 DIAGNOSIS — Z12.31 VISIT FOR SCREENING MAMMOGRAM: ICD-10-CM

## 2020-03-09 PROCEDURE — 77063 BREAST TOMOSYNTHESIS BI: CPT

## 2020-03-16 RX ORDER — ATORVASTATIN CALCIUM 10 MG/1
TABLET, FILM COATED ORAL
Qty: 90 TAB | Refills: 0 | Status: SHIPPED | OUTPATIENT
Start: 2020-03-16 | End: 2020-06-09

## 2020-06-02 RX ORDER — OLMESARTAN MEDOXOMIL AND HYDROCHLOROTHIAZIDE 40/12.5 40; 12.5 MG/1; MG/1
1 TABLET ORAL DAILY
Qty: 90 TAB | Refills: 1 | Status: SHIPPED | OUTPATIENT
Start: 2020-06-02 | End: 2020-12-02

## 2020-06-09 RX ORDER — ATORVASTATIN CALCIUM 10 MG/1
TABLET, FILM COATED ORAL
Qty: 90 TAB | Refills: 0 | Status: SHIPPED | OUTPATIENT
Start: 2020-06-09 | End: 2020-06-24

## 2020-06-24 RX ORDER — ATORVASTATIN CALCIUM 10 MG/1
TABLET, FILM COATED ORAL
Qty: 90 TAB | Refills: 0 | Status: SHIPPED | OUTPATIENT
Start: 2020-06-24 | End: 2020-12-17

## 2020-08-06 ENCOUNTER — OFFICE VISIT (OUTPATIENT)
Dept: CARDIOLOGY CLINIC | Age: 85
End: 2020-08-06

## 2020-08-06 VITALS
DIASTOLIC BLOOD PRESSURE: 68 MMHG | SYSTOLIC BLOOD PRESSURE: 140 MMHG | HEIGHT: 63 IN | TEMPERATURE: 97.6 F | HEART RATE: 73 BPM | BODY MASS INDEX: 34.52 KG/M2 | OXYGEN SATURATION: 95 % | WEIGHT: 194.8 LBS

## 2020-08-06 DIAGNOSIS — I50.32 CHRONIC DIASTOLIC HEART FAILURE (HCC): ICD-10-CM

## 2020-08-06 DIAGNOSIS — I10 ESSENTIAL HYPERTENSION, BENIGN: ICD-10-CM

## 2020-08-06 DIAGNOSIS — I25.10 ATHEROSCLEROSIS OF NATIVE CORONARY ARTERY OF NATIVE HEART WITHOUT ANGINA PECTORIS: Primary | ICD-10-CM

## 2020-08-06 DIAGNOSIS — E78.5 HYPERLIPIDEMIA, UNSPECIFIED HYPERLIPIDEMIA TYPE: ICD-10-CM

## 2020-08-06 NOTE — PROGRESS NOTES
1. Have you been to the ER, urgent care clinic since your last visit? Hospitalized since your last visit? No    2. Have you seen or consulted any other health care providers outside of the [address] [place] [address] since your last visit? Include any pap smears or colon screening.  Yes Where: PCP Reason for visit: Routine Visit

## 2020-08-06 NOTE — PROGRESS NOTES
HISTORY OF PRESENT ILLNESS  Kaleigh Moscoso is a 81 y.o. female. CHF   The history is provided by the patient. This is a chronic problem. The problem has not changed since onset. Associated symptoms include shortness of breath. Pertinent negatives include no chest pain, no abdominal pain and no headaches. The symptoms are aggravated by exertion. Valvular Heart Disease   The history is provided by the patient. This is a chronic problem. The problem occurs constantly. The problem has been gradually improving. Associated symptoms include shortness of breath. Pertinent negatives include no chest pain, no abdominal pain and no headaches. Shortness of Breath   The history is provided by the patient. The problem occurs intermittently. The problem has been gradually improving. Pertinent negatives include no fever, no headaches, no cough, no sputum production, no hemoptysis, no wheezing, no PND, no orthopnea, no chest pain, no vomiting, no abdominal pain, no rash, no leg swelling and no claudication. The problem's precipitants include exercise (exertion). Review of Systems   Constitutional: Negative for chills and fever. HENT: Negative for nosebleeds. Eyes: Negative for blurred vision and double vision. Respiratory: Positive for shortness of breath. Negative for cough, hemoptysis, sputum production and wheezing. Cardiovascular: Negative for chest pain, palpitations, orthopnea, claudication, leg swelling and PND. Gastrointestinal: Negative for abdominal pain, heartburn, nausea and vomiting. Musculoskeletal: Negative for myalgias. Skin: Negative for rash. Neurological: Negative for dizziness, weakness and headaches. Endo/Heme/Allergies: Does not bruise/bleed easily.      Family History   Problem Relation Age of Onset    Cancer Mother         colon     Heart Disease Father     Breast Cancer Sister     Cancer Sister 36        Breast    Diabetes Other         parent    Breast Cancer Other Past Medical History:   Diagnosis Date    [facility] ([facility] 3/12    [facility]     [facility]    [facility]     Right breast    [facility] ([facility] 8/15/2014    exertional sob stable nyha class2     Colon polyps     Congestive heart failure (HCC)     Coronary atherosclerosis of native coronary artery     Essential hypertension, benign     Heart murmur     History of breast cancer     Hypertension     Mitral valve disorders(424.0)     Mitral valve regurgitation     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     Other and unspecified hyperlipidemia     Radiation therapy     Radiation therapy complication        Past Surgical History:   Procedure Laterality Date    HX HYSTERECTOMY      HX MASTECTOMY  2012    Partial right    HX NEPHRECTOMY  2002    right    BERTIN BIOPSY BREAST STEREOTACTIC         Social History     Tobacco Use    Smoking status: Never Smoker    Smokeless tobacco: Never Used   Substance Use Topics    Alcohol use: Yes     Frequency: Monthly or less     Drinks per session: 1 or 2     Binge frequency: Never     Comment: occasinally       Allergies   Allergen Reactions    Erythromycin Not Reported This Time       Current Outpatient Medications   Medication Sig    atorvastatin (LIPITOR) 10 mg tablet take 1 tablet by mouth once daily    olmesartan-hydroCHLOROthiazide (BENICAR HCT) 40-12.5 mg per tablet Take 1 Tab by mouth daily.  metoprolol succinate (TOPROL-XL) 50 mg XL tablet take 1 tablet by mouth once daily    calcium carbonate/vitamin D3 (CALCIUM 600 WITH VITAMIN D3 PO) Take  by mouth.  nitroglycerin (NITROSTAT) 0.4 mg SL tablet 1 Tab by SubLINGual route every five (5) minutes as needed for up to 3 doses.  cyanocobalamin 1,000 mcg tablet Take 1,000 mcg by mouth daily.  cholecalciferol (VITAMIN D3) 1,000 unit cap Take  by mouth daily.     valACYclovir (VALTREX) 500 mg tablet 1,000 mg.    Omega-3-DHA-EPA-Fish Oil 1,000 (120-180) mg cap Take by mouth daily.  Cetirizine (ZYRTEC) 10 mg cap Take 10 mg by mouth as needed.  aspirin 81 mg tablet Take 81 mg by mouth daily.  multivitamins with minerals Cap Take  by mouth daily.  letrozole (FEMARA) 2.5 mg tablet Take 2.5 mg by mouth daily.  ACETAMINOPHEN (TYLENOL PO) Take  by mouth as needed.  IBUPROFEN PO Take 800 mg by mouth as needed. No current facility-administered medications for this visit. Visit Vitals  /68 (BP 1 Location: Left arm, BP Patient Position: Sitting)   Pulse 73   Temp 97.6 °F (36.4 °C) (Temporal)   Ht 5' 3\" (1.6 m)   Wt 88.4 kg (194 lb 12.8 oz)   SpO2 95%   BMI 34.51 kg/m²         Physical Exam   Constitutional: She is oriented to person, place, and time. She appears well-developed and well-nourished. HENT:   Head: Normocephalic and atraumatic. Eyes: Conjunctivae are normal.   Neck: Neck supple. No JVD present. No tracheal deviation present. No thyromegaly present. Cardiovascular: Normal rate and regular rhythm. PMI is not displaced. Exam reveals no gallop and no decreased pulses. Murmur heard. Early systolic murmur is present with a grade of 2/6 at the upper right sternal border. Pulmonary/Chest: No respiratory distress. She has no wheezes. She has no rales. She exhibits no tenderness. Abdominal: Soft. There is no abdominal tenderness. Musculoskeletal:         General: No edema. Neurological: She is alert and oriented to person, place, and time. Skin: Skin is warm. Psychiatric: She has a normal mood and affect. Ms. Ann Marie Ch has a reminder for a \"due or due soon\" health maintenance. I have asked that she contact her primary care provider for follow-up on this health maintenance. CARDIOLOGY STUDIES 1/30/2013   Myocardial Perfusion Scan Result 3/07 mild ant wall defect with partial reversibility;   Echocardiogram - Complete Result 3/10 NORMAL EF,MILD MR,MILD TR;    SUMMARY:2016  Left ventricle: The cavity was small.  Systolic function was normal.  Ejection fraction was estimated to be 65 %. No obvious wall motion  abnormalities identified in the views obtained. There was mild concentric  hypertrophy. Doppler parameters were consistent with abnormal left  ventricular relaxation (grade 1 diastolic dysfunction). Mitral valve: There was trivial regurgitation. Tricuspid valve: Tricuspid regurgitation peak velocity: 2 m/sec. Pulmonary  artery systolic pressure: 19 mmHg.            Assessment         ICD-10-CM ICD-9-CM    1. Atherosclerosis of native coronary artery of native heart without angina pectoris  I25.10 414.01     Stable continue current medical management   2. Chronic diastolic heart failure (HCC)  I50.32 428.32     Stable continue treatment   3. Essential hypertension, benign  I10 401.1     Stable monitor   4. Hyperlipidemia, unspecified hyperlipidemia type  E78.5 272.4     Continue treatment labs reviewed. ldl 59-   8/2019  Cardiac status stable continue current treatment. Lab with PCP  2/2020  Cardiac status stable. Recovering from bronchitis. Otherwise clinically stable  8/2020  Cardiac status stable continue current medical management  There are no discontinued medications. No orders of the defined types were placed in this encounter. Follow-up and Dispositions    · Return in about 6 months (around 2/6/2021).

## 2020-10-22 ENCOUNTER — TRANSCRIBE ORDER (OUTPATIENT)
Dept: SCHEDULING | Age: 85
End: 2020-10-22

## 2020-10-22 DIAGNOSIS — M81.0 SENILE OSTEOPOROSIS: Primary | ICD-10-CM

## 2020-11-24 ENCOUNTER — HOSPITAL ENCOUNTER (OUTPATIENT)
Dept: BONE DENSITY | Age: 85
Discharge: HOME | End: 2020-11-24
Attending: INTERNAL MEDICINE
Payer: MEDICARE

## 2020-11-24 DIAGNOSIS — M81.0 SENILE OSTEOPOROSIS: ICD-10-CM

## 2020-11-24 PROCEDURE — 77080 DXA BONE DENSITY AXIAL: CPT

## 2020-12-02 RX ORDER — OLMESARTAN MEDOXOMIL AND HYDROCHLOROTHIAZIDE 40/12.5 40; 12.5 MG/1; MG/1
TABLET ORAL
Qty: 90 TAB | Refills: 1 | Status: SHIPPED | OUTPATIENT
Start: 2020-12-02 | End: 2021-06-10

## 2020-12-17 RX ORDER — ATORVASTATIN CALCIUM 10 MG/1
TABLET, FILM COATED ORAL
Qty: 90 TAB | Refills: 0 | Status: SHIPPED | OUTPATIENT
Start: 2020-12-17 | End: 2021-03-22

## 2020-12-17 RX ORDER — METOPROLOL SUCCINATE 50 MG/1
TABLET, EXTENDED RELEASE ORAL
Qty: 90 TAB | Refills: 3 | Status: SHIPPED | OUTPATIENT
Start: 2020-12-17 | End: 2021-12-23

## 2021-02-04 ENCOUNTER — OFFICE VISIT (OUTPATIENT)
Dept: CARDIOLOGY CLINIC | Age: 86
End: 2021-02-04
Payer: MEDICARE

## 2021-02-04 VITALS
OXYGEN SATURATION: 94 % | DIASTOLIC BLOOD PRESSURE: 64 MMHG | HEIGHT: 63 IN | SYSTOLIC BLOOD PRESSURE: 146 MMHG | WEIGHT: 202 LBS | HEART RATE: 68 BPM | BODY MASS INDEX: 35.79 KG/M2 | TEMPERATURE: 97.9 F

## 2021-02-04 DIAGNOSIS — I10 ESSENTIAL HYPERTENSION, BENIGN: ICD-10-CM

## 2021-02-04 DIAGNOSIS — I25.10 ATHEROSCLEROSIS OF NATIVE CORONARY ARTERY OF NATIVE HEART WITHOUT ANGINA PECTORIS: Primary | ICD-10-CM

## 2021-02-04 DIAGNOSIS — I50.32 CHRONIC DIASTOLIC HEART FAILURE (HCC): ICD-10-CM

## 2021-02-04 DIAGNOSIS — E78.5 HYPERLIPIDEMIA, UNSPECIFIED HYPERLIPIDEMIA TYPE: ICD-10-CM

## 2021-02-04 PROCEDURE — G8399 PT W/DXA RESULTS DOCUMENT: HCPCS | Performed by: INTERNAL MEDICINE

## 2021-02-04 PROCEDURE — 1090F PRES/ABSN URINE INCON ASSESS: CPT | Performed by: INTERNAL MEDICINE

## 2021-02-04 PROCEDURE — 1100F PTFALLS ASSESS-DOCD GE2>/YR: CPT | Performed by: INTERNAL MEDICINE

## 2021-02-04 PROCEDURE — G8753 SYS BP > OR = 140: HCPCS | Performed by: INTERNAL MEDICINE

## 2021-02-04 PROCEDURE — G8432 DEP SCR NOT DOC, RNG: HCPCS | Performed by: INTERNAL MEDICINE

## 2021-02-04 PROCEDURE — 3288F FALL RISK ASSESSMENT DOCD: CPT | Performed by: INTERNAL MEDICINE

## 2021-02-04 PROCEDURE — G8417 CALC BMI ABV UP PARAM F/U: HCPCS | Performed by: INTERNAL MEDICINE

## 2021-02-04 PROCEDURE — 99214 OFFICE O/P EST MOD 30 MIN: CPT | Performed by: INTERNAL MEDICINE

## 2021-02-04 PROCEDURE — G8536 NO DOC ELDER MAL SCRN: HCPCS | Performed by: INTERNAL MEDICINE

## 2021-02-04 PROCEDURE — G8427 DOCREV CUR MEDS BY ELIG CLIN: HCPCS | Performed by: INTERNAL MEDICINE

## 2021-02-04 PROCEDURE — G8754 DIAS BP LESS 90: HCPCS | Performed by: INTERNAL MEDICINE

## 2021-02-04 RX ORDER — ALLOPURINOL 100 MG/1
200 TABLET ORAL DAILY
COMMUNITY

## 2021-02-04 RX ORDER — NITROGLYCERIN 0.4 MG/1
0.4 TABLET SUBLINGUAL
Qty: 25 TAB | Refills: 1 | Status: SHIPPED | OUTPATIENT
Start: 2021-02-04 | End: 2021-08-05 | Stop reason: SDUPTHER

## 2021-02-04 NOTE — PROGRESS NOTES
HISTORY OF PRESENT ILLNESS  Radha Jesus is a 81 y.o. female. CHF  The history is provided by the patient. This is a chronic problem. The problem has not changed since onset. Associated symptoms include shortness of breath. Pertinent negatives include no chest pain, no abdominal pain and no headaches. The symptoms are aggravated by exertion. Valvular Heart Disease  The history is provided by the patient. This is a chronic problem. The problem occurs constantly. The problem has been gradually improving. Associated symptoms include shortness of breath. Pertinent negatives include no chest pain, no abdominal pain and no headaches. Shortness of Breath  The history is provided by the patient. The problem occurs intermittently. The problem has been gradually improving. Pertinent negatives include no fever, no headaches, no cough, no sputum production, no hemoptysis, no wheezing, no PND, no orthopnea, no chest pain, no vomiting, no abdominal pain, no rash, no leg swelling and no claudication. The problem's precipitants include exercise (exertion). Review of Systems   Constitutional: Negative for chills and fever. HENT: Negative for nosebleeds. Eyes: Negative for blurred vision and double vision. Respiratory: Positive for shortness of breath. Negative for cough, hemoptysis, sputum production and wheezing. Cardiovascular: Negative for chest pain, palpitations, orthopnea, claudication, leg swelling and PND. Gastrointestinal: Negative for abdominal pain, heartburn, nausea and vomiting. Musculoskeletal: Negative for myalgias. Skin: Negative for rash. Neurological: Negative for dizziness, weakness and headaches. Endo/Heme/Allergies: Does not bruise/bleed easily.      Family History   Problem Relation Age of Onset    Cancer Mother         colon     Heart Disease Father     Breast Cancer Sister     Cancer Sister 36        Breast    Diabetes Other         parent    Breast Cancer Other Past Medical History:   Diagnosis Date    [facility] ([facility] 3/12    [facility]     renal    Cancer ([facility]     Right breast    Chronic diastolic heart failure ([facility] 8/15/2014    exertional sob stable nyha class2     Colon polyps     Congestive heart failure (HCC)     Coronary atherosclerosis of native coronary artery     Essential hypertension, benign     Heart murmur     History of breast cancer     Hypertension     Mitral valve disorders(424.0)     Mitral valve regurgitation     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     Other and unspecified hyperlipidemia     Radiation therapy     Radiation therapy complication        Past Surgical History:   Procedure Laterality Date    HX HYSTERECTOMY      HX MASTECTOMY  2012    Partial right    HX NEPHRECTOMY  2002    right    BERTIN BIOPSY BREAST STEREOTACTIC         Social History     Tobacco Use    Smoking status: Never Smoker    Smokeless tobacco: Never Used   Substance Use Topics    Alcohol use: Yes     Frequency: Monthly or less     Drinks per session: 1 or 2     Binge frequency: Never     Comment: occasinally       Allergies   Allergen Reactions    Erythromycin Not Reported This Time       Current Outpatient Medications   Medication Sig    allopurinoL (ZYLOPRIM) 100 mg tablet Take  by mouth daily.  nitroglycerin (NITROSTAT) 0.4 mg SL tablet 1 Tab by SubLINGual route every five (5) minutes as needed for Chest Pain for up to 3 doses.  metoprolol succinate (TOPROL-XL) 50 mg XL tablet take 1 tablet by mouth once daily    atorvastatin (LIPITOR) 10 mg tablet take 1 tablet by mouth once daily    olmesartan-hydroCHLOROthiazide (BENICAR HCT) 40-12.5 mg per tablet take 1 tablet by mouth once daily    calcium carbonate/vitamin D3 (CALCIUM 600 WITH VITAMIN D3 PO) Take  by mouth.  cyanocobalamin 1,000 mcg tablet Take 1,000 mcg by mouth daily.  cholecalciferol (VITAMIN D3) 1,000 unit cap Take  by mouth daily.     valACYclovir (VALTREX) 500 mg tablet 1,000 mg.    Omega-3-DHA-EPA-Fish Oil 1,000 (120-180) mg cap Take  by mouth daily.  Cetirizine (ZYRTEC) 10 mg cap Take 10 mg by mouth as needed.  aspirin 81 mg tablet Take 81 mg by mouth daily.  multivitamins with minerals Cap Take  by mouth daily.  letrozole (FEMARA) 2.5 mg tablet Take 2.5 mg by mouth daily.  ACETAMINOPHEN (TYLENOL PO) Take  by mouth as needed.  IBUPROFEN PO Take 800 mg by mouth as needed. No current facility-administered medications for this visit. Visit Vitals  BP (!) 146/64 (BP 1 Location: Left upper arm, BP Patient Position: Sitting, BP Cuff Size: Adult)   Pulse 68   Temp 97.9 °F (36.6 °C) (Temporal)   Ht 5' 3\" (1.6 m)   Wt 91.6 kg (202 lb)   SpO2 94%   BMI 35.78 kg/m²         Physical Exam   Constitutional: She is oriented to person, place, and time. She appears well-developed and well-nourished. HENT:   Head: Normocephalic and atraumatic. Eyes: Conjunctivae are normal.   Neck: Neck supple. No JVD present. No tracheal deviation present. No thyromegaly present. Cardiovascular: Normal rate and regular rhythm. PMI is not displaced. Exam reveals no gallop and no decreased pulses. Murmur heard. Early systolic murmur is present with a grade of 2/6 at the upper right sternal border. Pulmonary/Chest: No respiratory distress. She has no wheezes. She has no rales. She exhibits no tenderness. Abdominal: Soft. There is no abdominal tenderness. Musculoskeletal:         General: No edema. Neurological: She is alert and oriented to person, place, and time. Skin: Skin is warm. Psychiatric: She has a normal mood and affect. Ms. Rachel Amador has a reminder for a \"due or due soon\" health maintenance. I have asked that she contact her primary care provider for follow-up on this health maintenance.     CARDIOLOGY STUDIES 1/30/2013   Myocardial Perfusion Scan Result 3/07 mild ant wall defect with partial reversibility;   Echocardiogram - Complete Result 3/10 NORMAL EF,MILD MR,MILD TR;    SUMMARY:  Left ventricle: The cavity was small. Systolic function was normal.  Ejection fraction was estimated to be 65 %. No obvious wall motion  abnormalities identified in the views obtained. There was mild concentric  hypertrophy. Doppler parameters were consistent with abnormal left  ventricular relaxation (grade 1 diastolic dysfunction). Mitral valve: There was trivial regurgitation. Tricuspid valve: Tricuspid regurgitation peak velocity: 2 m/sec. Pulmonary  artery systolic pressure: 19 mmHg.            Assessment         ICD-10-CM ICD-9-CM    1. Atherosclerosis of native coronary artery of native heart without angina pectoris  I25.10 414.01     Stable continue treatment monitor   2. Chronic diastolic heart failure (HCC)  I50.32 428.32     Stable compensated continue treatment   3. Essential hypertension, benign  I10 401.1     Continue treatment stable   4. Hyperlipidemia, unspecified hyperlipidemia type  E78.5 272.4     Continue treatment lab with PCP   2019  Cardiac status stable continue current treatment. Lab with PCP  2020  Cardiac status stable. Recovering from bronchitis. Otherwise clinically stable  2020  Cardiac status stable continue current medical management  2021  Cardiac status stable continue current medical management. Mildly elevated systolic pressure which will monitor         Medications Discontinued During This Encounter   Medication Reason    nitroglycerin (NITROSTAT) 0.4 mg SL tablet REORDER       Orders Placed This Encounter    nitroglycerin (NITROSTAT) 0.4 mg SL tablet     Si Tab by SubLINGual route every five (5) minutes as needed for Chest Pain for up to 3 doses. Dispense:  25 Tab     Refill:  1       Follow-up and Dispositions    · Return in about 6 months (around 2021).

## 2021-02-04 NOTE — PROGRESS NOTES
1. Have you been to the ER, urgent care clinic since your last visit? Hospitalized since your last visit? No    2. Have you seen or consulted any other health care providers outside of the [address] [place] [address] since your last visit? Include any pap smears or colon screening.  Yes Where: PCP Routine/ Gastrology Routine

## 2021-02-18 ENCOUNTER — TRANSCRIBE ORDER (OUTPATIENT)
Dept: SCHEDULING | Age: 86
End: 2021-02-18

## 2021-02-18 DIAGNOSIS — Z12.31 VISIT FOR SCREENING MAMMOGRAM: Primary | ICD-10-CM

## 2021-03-22 RX ORDER — ATORVASTATIN CALCIUM 10 MG/1
TABLET, FILM COATED ORAL
Qty: 90 TAB | Refills: 0 | Status: SHIPPED | OUTPATIENT
Start: 2021-03-22 | End: 2021-07-01

## 2021-05-11 ENCOUNTER — HOSPITAL ENCOUNTER (OUTPATIENT)
Dept: MAMMOGRAPHY | Age: 86
Discharge: HOME | End: 2021-05-11
Attending: PHYSICIAN ASSISTANT
Payer: MEDICARE

## 2021-05-11 DIAGNOSIS — Z12.31 VISIT FOR SCREENING MAMMOGRAM: ICD-10-CM

## 2021-05-11 PROCEDURE — 77063 BREAST TOMOSYNTHESIS BI: CPT

## 2021-06-10 RX ORDER — OLMESARTAN MEDOXOMIL AND HYDROCHLOROTHIAZIDE 40/12.5 40; 12.5 MG/1; MG/1
TABLET ORAL
Qty: 90 TABLET | Refills: 1 | Status: SHIPPED | OUTPATIENT
Start: 2021-06-10 | End: 2021-12-16

## 2021-07-01 RX ORDER — ATORVASTATIN CALCIUM 10 MG/1
TABLET, FILM COATED ORAL
Qty: 90 TABLET | Refills: 0 | Status: SHIPPED | OUTPATIENT
Start: 2021-07-01 | End: 2021-10-05

## 2021-08-05 ENCOUNTER — OFFICE VISIT (OUTPATIENT)
Dept: CARDIOLOGY CLINIC | Age: 86
End: 2021-08-05
Payer: MEDICARE

## 2021-08-05 VITALS
HEIGHT: 63 IN | SYSTOLIC BLOOD PRESSURE: 134 MMHG | BODY MASS INDEX: 35.44 KG/M2 | HEART RATE: 73 BPM | OXYGEN SATURATION: 98 % | WEIGHT: 200 LBS | DIASTOLIC BLOOD PRESSURE: 65 MMHG

## 2021-08-05 DIAGNOSIS — I25.10 ATHEROSCLEROSIS OF NATIVE CORONARY ARTERY OF NATIVE HEART WITHOUT ANGINA PECTORIS: Primary | ICD-10-CM

## 2021-08-05 DIAGNOSIS — I50.32 CHRONIC DIASTOLIC HEART FAILURE (HCC): ICD-10-CM

## 2021-08-05 DIAGNOSIS — I10 ESSENTIAL HYPERTENSION, BENIGN: ICD-10-CM

## 2021-08-05 DIAGNOSIS — E78.5 HYPERLIPIDEMIA, UNSPECIFIED HYPERLIPIDEMIA TYPE: ICD-10-CM

## 2021-08-05 PROCEDURE — 1090F PRES/ABSN URINE INCON ASSESS: CPT | Performed by: INTERNAL MEDICINE

## 2021-08-05 PROCEDURE — G8510 SCR DEP NEG, NO PLAN REQD: HCPCS | Performed by: INTERNAL MEDICINE

## 2021-08-05 PROCEDURE — G8399 PT W/DXA RESULTS DOCUMENT: HCPCS | Performed by: INTERNAL MEDICINE

## 2021-08-05 PROCEDURE — 1100F PTFALLS ASSESS-DOCD GE2>/YR: CPT | Performed by: INTERNAL MEDICINE

## 2021-08-05 PROCEDURE — 3288F FALL RISK ASSESSMENT DOCD: CPT | Performed by: INTERNAL MEDICINE

## 2021-08-05 PROCEDURE — G8536 NO DOC ELDER MAL SCRN: HCPCS | Performed by: INTERNAL MEDICINE

## 2021-08-05 PROCEDURE — G8427 DOCREV CUR MEDS BY ELIG CLIN: HCPCS | Performed by: INTERNAL MEDICINE

## 2021-08-05 PROCEDURE — G8417 CALC BMI ABV UP PARAM F/U: HCPCS | Performed by: INTERNAL MEDICINE

## 2021-08-05 PROCEDURE — G8752 SYS BP LESS 140: HCPCS | Performed by: INTERNAL MEDICINE

## 2021-08-05 PROCEDURE — 99214 OFFICE O/P EST MOD 30 MIN: CPT | Performed by: INTERNAL MEDICINE

## 2021-08-05 PROCEDURE — G8754 DIAS BP LESS 90: HCPCS | Performed by: INTERNAL MEDICINE

## 2021-08-05 RX ORDER — NITROGLYCERIN 0.4 MG/1
0.4 TABLET SUBLINGUAL
Qty: 25 TABLET | Refills: 1 | Status: SHIPPED | OUTPATIENT
Start: 2021-08-05 | End: 2022-08-04 | Stop reason: SDUPTHER

## 2021-08-05 NOTE — PROGRESS NOTES
HISTORY OF PRESENT ILLNESS  Amadou Bergeron is a 81 y.o. female. CHF  The history is provided by the patient. This is a chronic problem. The problem has not changed since onset. Associated symptoms include shortness of breath. Pertinent negatives include no chest pain, no abdominal pain and no headaches. The symptoms are aggravated by exertion. Valvular Heart Disease  The history is provided by the patient. This is a chronic problem. The problem occurs constantly. The problem has been gradually improving. Associated symptoms include shortness of breath. Pertinent negatives include no chest pain, no abdominal pain and no headaches. Shortness of Breath  The history is provided by the patient. The problem occurs intermittently. The problem has been gradually improving. Pertinent negatives include no fever, no headaches, no cough, no sputum production, no hemoptysis, no wheezing, no PND, no orthopnea, no chest pain, no vomiting, no abdominal pain, no rash, no leg swelling and no claudication. The problem's precipitants include exercise (exertion). Review of Systems   Constitutional: Negative for chills and fever. HENT: Negative for nosebleeds. Eyes: Negative for blurred vision and double vision. Respiratory: Positive for shortness of breath. Negative for cough, hemoptysis, sputum production and wheezing. Cardiovascular: Negative for chest pain, palpitations, orthopnea, claudication, leg swelling and PND. Gastrointestinal: Negative for abdominal pain, heartburn, nausea and vomiting. Musculoskeletal: Negative for myalgias. Skin: Negative for rash. Neurological: Negative for dizziness, weakness and headaches. Endo/Heme/Allergies: Does not bruise/bleed easily.      Family History   Problem Relation Age of Onset    Cancer Mother         colon     Heart Disease Father     Breast Cancer Sister     Cancer Sister 36        Breast    Diabetes Other         parent    Breast Cancer Other Past Medical History:   Diagnosis Date    [facility] ([facility] 3/12    Cancer ([facility]     renal    Cancer ([facility]     Right breast    Chronic diastolic heart failure (HCC) 8/15/2014    exertional sob stable nyha class2     Colon polyps     Congestive heart failure (HCC)     Coronary atherosclerosis of native coronary artery     Essential hypertension, benign     Heart murmur     History of breast cancer     Hypertension     Mitral valve disorders(424.0)     Mitral valve regurgitation     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     Other and unspecified hyperlipidemia     Radiation therapy     Radiation therapy complication        Past Surgical History:   Procedure Laterality Date    HX HYSTERECTOMY      HX MASTECTOMY  2012    Partial right    HX NEPHRECTOMY  2002    right    BERTIN BIOPSY BREAST STEREOTACTIC         Social History     Tobacco Use    Smoking status: Never Smoker    Smokeless tobacco: Never Used   Substance Use Topics    Alcohol use: Yes     Comment: occasinally       Allergies   Allergen Reactions    Erythromycin Not Reported This Time       Current Outpatient Medications   Medication Sig    nitroglycerin (NITROSTAT) 0.4 mg SL tablet 1 Tablet by SubLINGual route every five (5) minutes as needed for Chest Pain for up to 3 doses.  atorvastatin (LIPITOR) 10 mg tablet take 1 tablet by mouth once daily    olmesartan-hydroCHLOROthiazide (BENICAR HCT) 40-12.5 mg per tablet take 1 tablet by mouth once daily    allopurinoL (ZYLOPRIM) 100 mg tablet Take  by mouth daily.  metoprolol succinate (TOPROL-XL) 50 mg XL tablet take 1 tablet by mouth once daily    calcium carbonate/vitamin D3 (CALCIUM 600 WITH VITAMIN D3 PO) Take  by mouth.  cyanocobalamin 1,000 mcg tablet Take 1,000 mcg by mouth daily.  cholecalciferol (VITAMIN D3) 1,000 unit cap Take  by mouth daily.  Omega-3-DHA-EPA-Fish Oil 1,000 (120-180) mg cap Take  by mouth daily.     Cetirizine (ZYRTEC) 10 mg cap Take 10 mg by mouth as needed.  aspirin 81 mg tablet Take 81 mg by mouth daily.  multivitamins with minerals Cap Take  by mouth daily.  letrozole (FEMARA) 2.5 mg tablet Take 2.5 mg by mouth daily.  ACETAMINOPHEN (TYLENOL PO) Take  by mouth as needed.  IBUPROFEN PO Take 800 mg by mouth as needed. No current facility-administered medications for this visit. Visit Vitals  /65 (BP 1 Location: Left upper arm, BP Patient Position: Sitting, BP Cuff Size: Adult)   Pulse 73   Ht 5' 3\" (1.6 m)   Wt 90.7 kg (200 lb)   SpO2 98%   BMI 35.43 kg/m²         Physical Exam  Constitutional:       Appearance: She is well-developed. HENT:      Head: Normocephalic and atraumatic. Eyes:      Conjunctiva/sclera: Conjunctivae normal.   Neck:      Thyroid: No thyromegaly. Vascular: No JVD. Trachea: No tracheal deviation. Cardiovascular:      Rate and Rhythm: Normal rate and regular rhythm. Chest Wall: PMI is not displaced. Pulses: No decreased pulses. Heart sounds: Murmur heard. Early systolic murmur is present with a grade of 2/6 at the upper right sternal border. No gallop. Pulmonary:      Effort: No respiratory distress. Breath sounds: No wheezing or rales. Chest:      Chest wall: No tenderness. Abdominal:      Palpations: Abdomen is soft. Tenderness: There is no abdominal tenderness. Musculoskeletal:      Cervical back: Neck supple. Skin:     General: Skin is warm. Neurological:      Mental Status: She is alert and oriented to person, place, and time. Ms. Vickie Henderson has a reminder for a \"due or due soon\" health maintenance. I have asked that she contact her primary care provider for follow-up on this health maintenance. CARDIOLOGY STUDIES 1/30/2013   Myocardial Perfusion Scan Result 3/07 mild ant wall defect with partial reversibility;   Echocardiogram - Complete Result 3/10 NORMAL EF,MILD MR,MILD TR;    SUMMARY:2016  Left ventricle:  The cavity was small. Systolic function was normal.  Ejection fraction was estimated to be 65 %. No obvious wall motion  abnormalities identified in the views obtained. There was mild concentric  hypertrophy. Doppler parameters were consistent with abnormal left  ventricular relaxation (grade 1 diastolic dysfunction). Mitral valve: There was trivial regurgitation. Tricuspid valve: Tricuspid regurgitation peak velocity: 2 m/sec. Pulmonary  artery systolic pressure: 19 mmHg.            Assessment         ICD-10-CM ICD-9-CM    1. Atherosclerosis of native coronary artery of native heart without angina pectoris  I25.10 414.01     Stable symptoms continue treatment monitor   2. Chronic diastolic heart failure (HCC)  I50.32 428.32     Stable compensated class II continue treatment   3. Essential hypertension, benign  I10 401.1     Stable continue treatment   4. Hyperlipidemia, unspecified hyperlipidemia type  E78.5 272.4     Continue treatment lab with PCP   2019  Cardiac status stable continue current treatment. Lab with PCP  2020  Cardiac status stable. Recovering from bronchitis. Otherwise clinically stable  2020  Cardiac status stable continue current medical management  2021  Cardiac status stable continue current medical management. Mildly elevated systolic pressure which will monitor  2021  Stable cardiac status. Blood pressure control. Shortness of breath stable continue current medical management       Medications Discontinued During This Encounter   Medication Reason    valACYclovir (VALTREX) 500 mg tablet Not A Current Medication    nitroglycerin (NITROSTAT) 0.4 mg SL tablet REORDER       Orders Placed This Encounter    nitroglycerin (NITROSTAT) 0.4 mg SL tablet     Si Tablet by SubLINGual route every five (5) minutes as needed for Chest Pain for up to 3 doses. Dispense:  25 Tablet     Refill:  1       Follow-up and Dispositions    · Return in about 6 months (around 2022).

## 2021-08-05 NOTE — PROGRESS NOTES
1. Have you been to the ER, urgent care clinic since your last visit? Hospitalized since your last visit? No    2. Have you seen or consulted any other health care providers outside of the [address] [place] [address] since your last visit? Include any pap smears or colon screening.  Yes Where: PCP Routine / Eye cataract surgery

## 2021-10-13 RX ORDER — ATORVASTATIN CALCIUM 10 MG/1
TABLET, FILM COATED ORAL
Qty: 90 TABLET | Refills: 1 | Status: SHIPPED | OUTPATIENT
Start: 2021-10-13 | End: 2022-10-03

## 2021-12-16 RX ORDER — OLMESARTAN MEDOXOMIL AND HYDROCHLOROTHIAZIDE 40/12.5 40; 12.5 MG/1; MG/1
TABLET ORAL
Qty: 90 TABLET | Refills: 1 | Status: SHIPPED | OUTPATIENT
Start: 2021-12-16 | End: 2022-07-11

## 2021-12-23 RX ORDER — METOPROLOL SUCCINATE 50 MG/1
TABLET, EXTENDED RELEASE ORAL
Qty: 90 TABLET | Refills: 3 | Status: SHIPPED | OUTPATIENT
Start: 2021-12-23

## 2022-02-03 ENCOUNTER — OFFICE VISIT (OUTPATIENT)
Dept: CARDIOLOGY CLINIC | Age: 87
End: 2022-02-03
Payer: MEDICARE

## 2022-02-03 VITALS
HEART RATE: 75 BPM | SYSTOLIC BLOOD PRESSURE: 108 MMHG | BODY MASS INDEX: 34.91 KG/M2 | HEIGHT: 63 IN | DIASTOLIC BLOOD PRESSURE: 53 MMHG | WEIGHT: 197 LBS

## 2022-02-03 DIAGNOSIS — E78.5 HYPERLIPIDEMIA, UNSPECIFIED HYPERLIPIDEMIA TYPE: ICD-10-CM

## 2022-02-03 DIAGNOSIS — I08.0 MULTIPLE LESIONS OF MITRAL AND AORTIC VALVE: ICD-10-CM

## 2022-02-03 DIAGNOSIS — I10 ESSENTIAL HYPERTENSION, BENIGN: ICD-10-CM

## 2022-02-03 DIAGNOSIS — I25.10 ATHEROSCLEROSIS OF NATIVE CORONARY ARTERY OF NATIVE HEART WITHOUT ANGINA PECTORIS: Primary | ICD-10-CM

## 2022-02-03 DIAGNOSIS — I50.32 CHRONIC DIASTOLIC HEART FAILURE (HCC): ICD-10-CM

## 2022-02-03 PROCEDURE — G8417 CALC BMI ABV UP PARAM F/U: HCPCS | Performed by: INTERNAL MEDICINE

## 2022-02-03 PROCEDURE — 1101F PT FALLS ASSESS-DOCD LE1/YR: CPT | Performed by: INTERNAL MEDICINE

## 2022-02-03 PROCEDURE — G8510 SCR DEP NEG, NO PLAN REQD: HCPCS | Performed by: INTERNAL MEDICINE

## 2022-02-03 PROCEDURE — G8536 NO DOC ELDER MAL SCRN: HCPCS | Performed by: INTERNAL MEDICINE

## 2022-02-03 PROCEDURE — G8399 PT W/DXA RESULTS DOCUMENT: HCPCS | Performed by: INTERNAL MEDICINE

## 2022-02-03 PROCEDURE — 1090F PRES/ABSN URINE INCON ASSESS: CPT | Performed by: INTERNAL MEDICINE

## 2022-02-03 PROCEDURE — G8752 SYS BP LESS 140: HCPCS | Performed by: INTERNAL MEDICINE

## 2022-02-03 PROCEDURE — 99214 OFFICE O/P EST MOD 30 MIN: CPT | Performed by: INTERNAL MEDICINE

## 2022-02-03 PROCEDURE — G8427 DOCREV CUR MEDS BY ELIG CLIN: HCPCS | Performed by: INTERNAL MEDICINE

## 2022-02-03 PROCEDURE — G8754 DIAS BP LESS 90: HCPCS | Performed by: INTERNAL MEDICINE

## 2022-02-03 NOTE — PROGRESS NOTES
1. Have you been to the ER, urgent care clinic since your last visit? Hospitalized since your last visit?     no    2. Have you seen or consulted any other health care providers outside of the [address] [place] [address] since your last visit? Include any pap smears or colon screening.       No

## 2022-04-12 ENCOUNTER — TRANSCRIBE ORDER (OUTPATIENT)
Dept: SCHEDULING | Age: 87
End: 2022-04-12

## 2022-04-12 DIAGNOSIS — Z12.31 VISIT FOR SCREENING MAMMOGRAM: Primary | ICD-10-CM

## 2022-05-19 ENCOUNTER — HOSPITAL ENCOUNTER (OUTPATIENT)
Dept: MAMMOGRAPHY | Age: 87
Discharge: HOME | End: 2022-05-19
Attending: INTERNAL MEDICINE
Payer: MEDICARE

## 2022-05-19 DIAGNOSIS — Z12.31 VISIT FOR SCREENING MAMMOGRAM: ICD-10-CM

## 2022-05-19 PROCEDURE — 77063 BREAST TOMOSYNTHESIS BI: CPT

## 2022-07-11 RX ORDER — OLMESARTAN MEDOXOMIL AND HYDROCHLOROTHIAZIDE 40/12.5 40; 12.5 MG/1; MG/1
TABLET ORAL
Qty: 90 TABLET | Refills: 1 | Status: SHIPPED | OUTPATIENT
Start: 2022-07-11

## 2022-08-04 ENCOUNTER — OFFICE VISIT (OUTPATIENT)
Dept: CARDIOLOGY CLINIC | Age: 87
End: 2022-08-04
Payer: MEDICARE

## 2022-08-04 VITALS
SYSTOLIC BLOOD PRESSURE: 113 MMHG | BODY MASS INDEX: 34.38 KG/M2 | OXYGEN SATURATION: 97 % | HEIGHT: 63 IN | HEART RATE: 71 BPM | WEIGHT: 194 LBS | DIASTOLIC BLOOD PRESSURE: 61 MMHG

## 2022-08-04 DIAGNOSIS — I08.0 MULTIPLE LESIONS OF MITRAL AND AORTIC VALVE: ICD-10-CM

## 2022-08-04 DIAGNOSIS — I25.10 ATHEROSCLEROSIS OF NATIVE CORONARY ARTERY OF NATIVE HEART WITHOUT ANGINA PECTORIS: Primary | ICD-10-CM

## 2022-08-04 DIAGNOSIS — I50.32 CHRONIC DIASTOLIC HEART FAILURE (HCC): ICD-10-CM

## 2022-08-04 DIAGNOSIS — I10 ESSENTIAL HYPERTENSION, BENIGN: ICD-10-CM

## 2022-08-04 DIAGNOSIS — E78.5 HYPERLIPIDEMIA, UNSPECIFIED HYPERLIPIDEMIA TYPE: ICD-10-CM

## 2022-08-04 PROCEDURE — 1123F ACP DISCUSS/DSCN MKR DOCD: CPT | Performed by: INTERNAL MEDICINE

## 2022-08-04 PROCEDURE — G8536 NO DOC ELDER MAL SCRN: HCPCS | Performed by: INTERNAL MEDICINE

## 2022-08-04 PROCEDURE — 1090F PRES/ABSN URINE INCON ASSESS: CPT | Performed by: INTERNAL MEDICINE

## 2022-08-04 PROCEDURE — 99214 OFFICE O/P EST MOD 30 MIN: CPT | Performed by: INTERNAL MEDICINE

## 2022-08-04 PROCEDURE — G8417 CALC BMI ABV UP PARAM F/U: HCPCS | Performed by: INTERNAL MEDICINE

## 2022-08-04 PROCEDURE — G8432 DEP SCR NOT DOC, RNG: HCPCS | Performed by: INTERNAL MEDICINE

## 2022-08-04 PROCEDURE — G8427 DOCREV CUR MEDS BY ELIG CLIN: HCPCS | Performed by: INTERNAL MEDICINE

## 2022-08-04 PROCEDURE — 1101F PT FALLS ASSESS-DOCD LE1/YR: CPT | Performed by: INTERNAL MEDICINE

## 2022-08-04 RX ORDER — NITROGLYCERIN 0.4 MG/1
0.4 TABLET SUBLINGUAL
Qty: 25 TABLET | Refills: 1 | Status: SHIPPED | OUTPATIENT
Start: 2022-08-04

## 2022-08-04 NOTE — PROGRESS NOTES
HISTORY OF PRESENT ILLNESS  Dwayne Parada is a 81 y.o. female. Follow-up  Associated symptoms include shortness of breath. Pertinent negatives include no chest pain, no abdominal pain and no headaches. CHF  The history is provided by the Patient. This is a chronic problem. The problem has not changed since onset. Associated symptoms include shortness of breath. Pertinent negatives include no chest pain, no abdominal pain and no headaches. The symptoms are aggravated by exertion. Valvular Heart Disease  The history is provided by the Patient. This is a chronic problem. The problem occurs constantly. The problem has been gradually improving. Associated symptoms include shortness of breath. Pertinent negatives include no chest pain, no abdominal pain and no headaches. Shortness of Breath  The history is provided by the Patient. The problem occurs intermittently. The problem has been gradually improving. Pertinent negatives include no fever, no headaches, no cough, no sputum production, no hemoptysis, no wheezing, no PND, no orthopnea, no chest pain, no vomiting, no abdominal pain, no rash, no leg swelling and no claudication. The problem's precipitants include exercise (exertion). Review of Systems   Constitutional:  Negative for chills and fever. HENT:  Negative for nosebleeds. Eyes:  Negative for blurred vision and double vision. Respiratory:  Positive for shortness of breath. Negative for cough, hemoptysis, sputum production and wheezing. Cardiovascular:  Negative for chest pain, palpitations, orthopnea, claudication, leg swelling and PND. Gastrointestinal:  Negative for abdominal pain, heartburn, nausea and vomiting. Musculoskeletal:  Negative for myalgias. Skin:  Negative for rash. Neurological:  Negative for dizziness, weakness and headaches. Endo/Heme/Allergies:  Does not bruise/bleed easily.    Family History   Problem Relation Age of Onset    Cancer Mother         colon Heart Disease Father     Breast Cancer Sister     Cancer Sister 36        Breast    Diabetes Other         parent    Breast Cancer Other        Past Medical History:   Diagnosis Date    Breast cancer ([facility] [address] 3/12    Cancer ([facility] [address]     renal    Cancer ([facility] [address]     Right breast    Chronic diastolic heart failure ([facility] [address] 8/15/2014    exertional sob stable nyha class2     Colon polyps     Congestive heart failure (HCC)     Coronary atherosclerosis of native coronary artery     Essential hypertension, benign     Heart murmur     History of breast cancer     Hypertension     Mitral valve disorders(424.0)     Mitral valve regurgitation     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     Other and unspecified hyperlipidemia     Radiation therapy     Radiation therapy complication        Past Surgical History:   Procedure Laterality Date    HX HYSTERECTOMY      HX MASTECTOMY  2012    Partial right    HX NEPHRECTOMY  2002    right    BERTIN BIOPSY BREAST STEREOTACTIC         Social History     Tobacco Use    Smoking status: Never    Smokeless tobacco: Never   Substance Use Topics    Alcohol use: Yes     Comment: occasinally       Allergies   Allergen Reactions    Erythromycin Not Reported This Time       Current Outpatient Medications   Medication Sig    nitroglycerin (NITROSTAT) 0.4 mg SL tablet 1 Tablet by SubLINGual route every five (5) minutes as needed for Chest Pain for up to 3 doses. olmesartan-hydroCHLOROthiazide (BENICAR HCT) 40-12.5 mg per tablet take 1 tablet by mouth once daily    metoprolol succinate (TOPROL-XL) 50 mg XL tablet take 1 tablet by mouth once daily    atorvastatin (LIPITOR) 10 mg tablet take 1 tablet by mouth once daily    allopurinoL (ZYLOPRIM) 100 mg tablet Take  by mouth daily. calcium carbonate/vitamin D3 (CALCIUM 600 WITH VITAMIN D3 PO) Take  by mouth.    cyanocobalamin 1,000 mcg tablet Take 1,000 mcg by mouth daily. cholecalciferol (VITAMIN D3) 1,000 unit cap Take  by mouth daily. Omega-3-DHA-EPA-Fish Oil 1,000 (120-180) mg cap Take  by mouth daily. Cetirizine 10 mg cap Take 10 mg by mouth as needed. aspirin 81 mg tablet Take 81 mg by mouth daily. multivitamins with minerals Cap Take  by mouth daily. ACETAMINOPHEN (TYLENOL PO) Take  by mouth as needed. IBUPROFEN PO Take 800 mg by mouth as needed. letrozole (FEMARA) 2.5 mg tablet Take 2.5 mg by mouth daily. (Patient not taking: Reported on 8/4/2022)     No current facility-administered medications for this visit. Visit Vitals  /61 (BP 1 Location: Left upper arm, BP Patient Position: Sitting, BP Cuff Size: Large adult)   Pulse 71   Ht 5' 3\" (1.6 m)   Wt 88 kg (194 lb)   SpO2 97%   BMI 34.37 kg/m²         Physical Exam  Constitutional:       Appearance: She is well-developed. HENT:      Head: Normocephalic and atraumatic. Eyes:      Conjunctiva/sclera: Conjunctivae normal.   Neck:      Thyroid: No thyromegaly. Vascular: No JVD. Trachea: No tracheal deviation. Cardiovascular:      Rate and Rhythm: Normal rate and regular rhythm. Chest Wall: PMI is not displaced. Pulses: No decreased pulses. Heart sounds: Murmur heard. Early systolic murmur is present with a grade of 2/6 at the upper right sternal border. No gallop. Pulmonary:      Effort: No respiratory distress. Breath sounds: No wheezing or rales. Chest:      Chest wall: No tenderness. Abdominal:      Palpations: Abdomen is soft. Tenderness: There is no abdominal tenderness. Musculoskeletal:      Cervical back: Neck supple. Skin:     General: Skin is warm. Neurological:      Mental Status: She is alert and oriented to person, place, and time. Ms. Ludivina Segura has a reminder for a \"due or due soon\" health maintenance. I have asked that she contact her primary care provider for follow-up on this health maintenance.     CARDIOLOGY STUDIES 1/30/2013   Myocardial Perfusion Scan Result 3/07 mild ant wall defect with partial reversibility;   Echocardiogram - Complete Result 3/10 NORMAL EF,MILD MR,MILD TR;    SUMMARY:2016  Left ventricle: The cavity was small. Systolic function was normal.  Ejection fraction was estimated to be 65 %. No obvious wall motion  abnormalities identified in the views obtained. There was mild concentric  hypertrophy. Doppler parameters were consistent with abnormal left  ventricular relaxation (grade 1 diastolic dysfunction). Mitral valve: There was trivial regurgitation. Tricuspid valve: Tricuspid regurgitation peak velocity: 2 m/sec. Pulmonary  artery systolic pressure: 19 mmHg. Assessment         ICD-10-CM ICD-9-CM    1. Atherosclerosis of native coronary artery of native heart without angina pectoris  I25.10 414.01     Stable continue treatment monitor      2. Chronic diastolic heart failure (HCC)  I50.32 428.32     Stable compensated class II      3. Essential hypertension, benign  I10 401.1     Controlled continue treatment      4. Hyperlipidemia, unspecified hyperlipidemia type  E78.5 272.4     Continue treatment lab with PCP      5. 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 Lab with PCP  2/2020  Cardiac status stable. Recovering from bronchitis. Otherwise clinically stable  8/2020  Cardiac status stable continue current medical management  2/2021  Cardiac status stable continue current medical management. Mildly elevated systolic pressure which will monitor  8/2021  Stable cardiac status. Blood pressure control. Shortness of breath stable continue current medical management  2/2022  Stable cardiac status. Blood pressure controlled. Shortness of breath on activity stable pattern. Recent lab reviewed. LDL 55 into 2022. Hemoglobin A1c is mildly elevated at 6.5  8/2022  1 episode of fatigue and shortness of breath on minimal activity. We will follow-up echo.   Blood pressure controlled continue treatment  Medications Discontinued During This Encounter   Medication Reason    nitroglycerin (NITROSTAT) 0.4 mg SL tablet REORDER       Orders Placed This Encounter    nitroglycerin (NITROSTAT) 0.4 mg SL tablet     Si Tablet by SubLINGual route every five (5) minutes as needed for Chest Pain for up to 3 doses.      Dispense:  25 Tablet     Refill:  1

## 2022-08-04 NOTE — PROGRESS NOTES
1. Have you been to the ER, urgent care clinic since your last visit? Hospitalized since your last visit? No    2. Have you seen or consulted any other health care providers outside of the [address] [place] [address] since your last visit? Include any pap smears or colon screening.       Yes Where: Dr. Rosita Riedel Reason for visit: PCP

## 2022-08-06 NOTE — PROGRESS NOTES
HISTORY OF PRESENT ILLNESS  Yossi Ruiz is a 81 y.o. female. CHF  The history is provided by the patient. This is a chronic problem. The problem has not changed since onset. Associated symptoms include shortness of breath. Pertinent negatives include no chest pain, no abdominal pain and no headaches. The symptoms are aggravated by exertion. Valvular Heart Disease  The history is provided by the patient. This is a chronic problem. The problem occurs constantly. The problem has been gradually improving. Associated symptoms include shortness of breath. Pertinent negatives include no chest pain, no abdominal pain and no headaches. Shortness of Breath  The history is provided by the patient. The problem occurs intermittently. The problem has been gradually improving. Pertinent negatives include no fever, no headaches, no cough, no sputum production, no hemoptysis, no wheezing, no PND, no orthopnea, no chest pain, no vomiting, no abdominal pain, no rash, no leg swelling and no claudication. The problem's precipitants include exercise (exertion). Follow-up  Associated symptoms include shortness of breath. Pertinent negatives include no chest pain, no abdominal pain and no headaches. Review of Systems   Constitutional: Negative for chills and fever. HENT: Negative for nosebleeds. Eyes: Negative for blurred vision and double vision. Respiratory: Positive for shortness of breath. Negative for cough, hemoptysis, sputum production and wheezing. Cardiovascular: Negative for chest pain, palpitations, orthopnea, claudication, leg swelling and PND. Gastrointestinal: Negative for abdominal pain, heartburn, nausea and vomiting. Musculoskeletal: Negative for myalgias. Skin: Negative for rash. Neurological: Negative for dizziness, weakness and headaches. Endo/Heme/Allergies: Does not bruise/bleed easily.      Family History   Problem Relation Age of Onset    Cancer Mother         colon    Angelica Coral Heart Disease Father     Breast Cancer Sister     Cancer Sister 36        Breast    Diabetes Other         parent    Breast Cancer Other        Past Medical History:   Diagnosis Date    Breast cancer ([facility] [address] 3/12    Cancer ([facility] [address]     renal    Cancer ([facility] [address]     Right breast    Chronic diastolic heart failure ([facility] [address] 8/15/2014    exertional sob stable nyha class2     Colon polyps     Congestive heart failure (HCC)     Coronary atherosclerosis of native coronary artery     Essential hypertension, benign     Heart murmur     History of breast cancer     Hypertension     Mitral valve disorders(424.0)     Mitral valve regurgitation     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     Other and unspecified hyperlipidemia     Radiation therapy     Radiation therapy complication        Past Surgical History:   Procedure Laterality Date    HX HYSTERECTOMY      HX MASTECTOMY  2012    Partial right    HX NEPHRECTOMY  2002    right    BERTIN BIOPSY BREAST STEREOTACTIC         Social History     Tobacco Use    Smoking status: Never Smoker    Smokeless tobacco: Never Used   Substance Use Topics    Alcohol use: Yes     Comment: occasinally       Allergies   Allergen Reactions    Erythromycin Not Reported This Time       Current Outpatient Medications   Medication Sig    metoprolol succinate (TOPROL-XL) 50 mg XL tablet take 1 tablet by mouth once daily    olmesartan-hydroCHLOROthiazide (BENICAR HCT) 40-12.5 mg per tablet take 1 tablet by mouth once daily    atorvastatin (LIPITOR) 10 mg tablet take 1 tablet by mouth once daily    nitroglycerin (NITROSTAT) 0.4 mg SL tablet 1 Tablet by SubLINGual route every five (5) minutes as needed for Chest Pain for up to 3 doses.  allopurinoL (ZYLOPRIM) 100 mg tablet Take  by mouth daily.  calcium carbonate/vitamin D3 (CALCIUM 600 WITH VITAMIN D3 PO) Take  by mouth.  cyanocobalamin 1,000 mcg tablet Take 1,000 mcg by mouth daily.     cholecalciferol (VITAMIN D3) 1,000 unit cap Take  by mouth daily.  Omega-3-DHA-EPA-Fish Oil 1,000 (120-180) mg cap Take  by mouth daily.  Cetirizine (ZYRTEC) 10 mg cap Take 10 mg by mouth as needed.  aspirin 81 mg tablet Take 81 mg by mouth daily.  multivitamins with minerals Cap Take  by mouth daily.  letrozole (FEMARA) 2.5 mg tablet Take 2.5 mg by mouth daily.  ACETAMINOPHEN (TYLENOL PO) Take  by mouth as needed.  IBUPROFEN PO Take 800 mg by mouth as needed. No current facility-administered medications for this visit. Visit Vitals  BP (!) 108/53   Pulse 75   Ht 5' 3\" (1.6 m)   Wt 89.4 kg (197 lb)   BMI 34.90 kg/m²         Physical Exam  Constitutional:       Appearance: She is well-developed. HENT:      Head: Normocephalic and atraumatic. Eyes:      Conjunctiva/sclera: Conjunctivae normal.   Neck:      Thyroid: No thyromegaly. Vascular: No JVD. Trachea: No tracheal deviation. Cardiovascular:      Rate and Rhythm: Normal rate and regular rhythm. Chest Wall: PMI is not displaced. Pulses: No decreased pulses. Heart sounds: Murmur heard. Early systolic murmur is present with a grade of 2/6 at the upper right sternal border. No gallop. Pulmonary:      Effort: No respiratory distress. Breath sounds: No wheezing or rales. Chest:      Chest wall: No tenderness. Abdominal:      Palpations: Abdomen is soft. Tenderness: There is no abdominal tenderness. Musculoskeletal:      Cervical back: Neck supple. Skin:     General: Skin is warm. Neurological:      Mental Status: She is alert and oriented to person, place, and time. Ms. Yamileth Ceballos has a reminder for a \"due or due soon\" health maintenance. I have asked that she contact her primary care provider for follow-up on this health maintenance.     CARDIOLOGY STUDIES 1/30/2013   Myocardial Perfusion Scan Result 3/07 mild ant wall defect with partial reversibility;   Echocardiogram - Complete Result 3/10 NORMAL EF,MILD MR,MILD TR;    SUMMARY:2016  Left ventricle: The cavity was small. Systolic function was normal.  Ejection fraction was estimated to be 65 %. No obvious wall motion  abnormalities identified in the views obtained. There was mild concentric  hypertrophy. Doppler parameters were consistent with abnormal left  ventricular relaxation (grade 1 diastolic dysfunction). Mitral valve: There was trivial regurgitation. Tricuspid valve: Tricuspid regurgitation peak velocity: 2 m/sec. Pulmonary  artery systolic pressure: 19 mmHg.            Assessment         ICD-10-CM ICD-9-CM    1. Atherosclerosis of native coronary artery of native heart without angina pectoris  I25.10 414.01     Stable symptoms continue treatment monitor   2. Chronic diastolic heart failure (HCC)  I50.32 428.32     Stable compensated class III   3. Essential hypertension, benign  I10 401.1     Stable   4. Hyperlipidemia, unspecified hyperlipidemia type  E78.5 272.4     Continue treatment lab reviewed   5. 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 Lab with PCP  2/2020  Cardiac status stable. Recovering from bronchitis. Otherwise clinically stable  8/2020  Cardiac status stable continue current medical management  2/2021  Cardiac status stable continue current medical management. Mildly elevated systolic pressure which will monitor  8/2021  Stable cardiac status. Blood pressure control. Shortness of breath stable continue current medical management  2/2022  Stable cardiac status. Blood pressure controlled. Shortness of breath on activity stable pattern. Recent lab reviewed. LDL 55 into 2022. Hemoglobin A1c is mildly elevated at 6.5    There are no discontinued medications. No orders of the defined types were placed in this encounter. Follow-up and Dispositions    · Return in about 6 months (around 8/3/2022). no

## 2022-10-03 RX ORDER — ATORVASTATIN CALCIUM 10 MG/1
TABLET, FILM COATED ORAL
Qty: 90 TABLET | Refills: 1 | Status: SHIPPED | OUTPATIENT
Start: 2022-10-03

## 2022-10-06 ENCOUNTER — OFFICE VISIT (OUTPATIENT)
Dept: CARDIOLOGY CLINIC | Age: 87
End: 2022-10-06
Payer: MEDICARE

## 2022-10-06 VITALS
BODY MASS INDEX: 34.91 KG/M2 | HEIGHT: 63 IN | SYSTOLIC BLOOD PRESSURE: 114 MMHG | HEART RATE: 70 BPM | DIASTOLIC BLOOD PRESSURE: 60 MMHG | OXYGEN SATURATION: 97 % | WEIGHT: 197 LBS

## 2022-10-06 DIAGNOSIS — I10 ESSENTIAL HYPERTENSION, BENIGN: ICD-10-CM

## 2022-10-06 DIAGNOSIS — I08.0 MULTIPLE LESIONS OF MITRAL AND AORTIC VALVE: ICD-10-CM

## 2022-10-06 DIAGNOSIS — I25.10 ATHEROSCLEROSIS OF NATIVE CORONARY ARTERY OF NATIVE HEART WITHOUT ANGINA PECTORIS: Primary | ICD-10-CM

## 2022-10-06 DIAGNOSIS — E78.5 HYPERLIPIDEMIA, UNSPECIFIED HYPERLIPIDEMIA TYPE: ICD-10-CM

## 2022-10-06 DIAGNOSIS — I50.32 CHRONIC DIASTOLIC HEART FAILURE (HCC): ICD-10-CM

## 2022-10-06 PROCEDURE — G8417 CALC BMI ABV UP PARAM F/U: HCPCS | Performed by: NURSE PRACTITIONER

## 2022-10-06 PROCEDURE — 1090F PRES/ABSN URINE INCON ASSESS: CPT | Performed by: NURSE PRACTITIONER

## 2022-10-06 PROCEDURE — G8432 DEP SCR NOT DOC, RNG: HCPCS | Performed by: NURSE PRACTITIONER

## 2022-10-06 PROCEDURE — G8536 NO DOC ELDER MAL SCRN: HCPCS | Performed by: NURSE PRACTITIONER

## 2022-10-06 PROCEDURE — 99214 OFFICE O/P EST MOD 30 MIN: CPT | Performed by: NURSE PRACTITIONER

## 2022-10-06 PROCEDURE — G8427 DOCREV CUR MEDS BY ELIG CLIN: HCPCS | Performed by: NURSE PRACTITIONER

## 2022-10-06 PROCEDURE — 1123F ACP DISCUSS/DSCN MKR DOCD: CPT | Performed by: NURSE PRACTITIONER

## 2022-10-06 PROCEDURE — 1101F PT FALLS ASSESS-DOCD LE1/YR: CPT | Performed by: NURSE PRACTITIONER

## 2022-10-06 NOTE — PROGRESS NOTES
1. Have you been to the ER, urgent care clinic since your last visit? Hospitalized since your last visit?     no    2. Have you seen or consulted any other health care providers outside of the [address] [place] [address] since your last visit? Include any pap smears or colon screening.      no

## 2022-10-06 NOTE — PROGRESS NOTES
HISTORY OF PRESENT ILLNESS  Harsh Douglas is a 81 y.o. female. Follow-up  Associated symptoms include shortness of breath. Pertinent negatives include no chest pain, no abdominal pain and no headaches. CHF  The history is provided by the Patient. This is a chronic problem. The problem has not changed since onset. Associated symptoms include shortness of breath. Pertinent negatives include no chest pain, no abdominal pain and no headaches. The symptoms are aggravated by exertion. Valvular Heart Disease  The history is provided by the Patient. This is a chronic problem. The problem occurs constantly. The problem has been gradually improving. Associated symptoms include shortness of breath. Pertinent negatives include no chest pain, no abdominal pain and no headaches. Shortness of Breath  The history is provided by the Patient. The problem occurs intermittently. The problem has been gradually improving. Pertinent negatives include no fever, no headaches, no cough, no sputum production, no hemoptysis, no wheezing, no PND, no orthopnea, no chest pain, no vomiting, no abdominal pain, no rash, no leg swelling and no claudication. The problem's precipitants include exercise (exertion). Review of Systems   Constitutional:  Negative for chills and fever. HENT:  Negative for nosebleeds. Eyes:  Negative for blurred vision and double vision. Respiratory:  Positive for shortness of breath. Negative for cough, hemoptysis, sputum production and wheezing. Cardiovascular:  Negative for chest pain, palpitations, orthopnea, claudication, leg swelling and PND. Gastrointestinal:  Negative for abdominal pain, heartburn, nausea and vomiting. Musculoskeletal:  Negative for myalgias. Skin:  Negative for rash. Neurological:  Negative for dizziness, weakness and headaches. Endo/Heme/Allergies:  Does not bruise/bleed easily.    Family History   Problem Relation Age of Onset    Cancer Mother         colon Heart Disease Father     Breast Cancer Sister     Cancer Sister 36        Breast    Diabetes Other         parent    Breast Cancer Other        Past Medical History:   Diagnosis Date    Breast cancer ([facility] [address] 3/12    Cancer ([facility] [address]     renal    Cancer ([facility] [address]     Right breast    Chronic diastolic heart failure ([facility] [address] 8/15/2014    exertional sob stable nyha class2     Colon polyps     Congestive heart failure (HCC)     Coronary atherosclerosis of native coronary artery     Essential hypertension, benign     Heart murmur     History of breast cancer     Hypertension     Mitral valve disorders(424.0)     Mitral valve regurgitation     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     Other and unspecified hyperlipidemia     Radiation therapy     Radiation therapy complication        Past Surgical History:   Procedure Laterality Date    HX HYSTERECTOMY      HX MASTECTOMY  2012    Partial right    HX NEPHRECTOMY  2002    right    BERTIN BIOPSY BREAST STEREOTACTIC         Social History     Tobacco Use    Smoking status: Never    Smokeless tobacco: Never   Substance Use Topics    Alcohol use: Yes     Comment: occasinally       Allergies   Allergen Reactions    Erythromycin Not Reported This Time       Current Outpatient Medications   Medication Sig    atorvastatin (LIPITOR) 10 mg tablet take 1 tablet by mouth once daily    nitroglycerin (NITROSTAT) 0.4 mg SL tablet 1 Tablet by SubLINGual route every five (5) minutes as needed for Chest Pain for up to 3 doses. olmesartan-hydroCHLOROthiazide (BENICAR HCT) 40-12.5 mg per tablet take 1 tablet by mouth once daily    metoprolol succinate (TOPROL-XL) 50 mg XL tablet take 1 tablet by mouth once daily    allopurinoL (ZYLOPRIM) 100 mg tablet Take 200 mg by mouth daily. calcium carbonate/vitamin D3 (CALCIUM 600 WITH VITAMIN D3 PO) Take  by mouth.    cyanocobalamin 1,000 mcg tablet Take 1,000 mcg by mouth daily. cholecalciferol (VITAMIN D3) 1,000 unit cap Take  by mouth daily. Omega-3-DHA-EPA-Fish Oil 1,000 (120-180) mg cap Take  by mouth daily. Cetirizine 10 mg cap Take 10 mg by mouth as needed. aspirin 81 mg tablet Take 81 mg by mouth daily. multivitamins with minerals Cap Take  by mouth daily. ACETAMINOPHEN (TYLENOL PO) Take  by mouth as needed. No current facility-administered medications for this visit. Visit Vitals  /60 (BP 1 Location: Left upper arm, BP Patient Position: Sitting)   Pulse 70   Ht 5' 3\" (1.6 m)   Wt 89.4 kg (197 lb)   SpO2 97%   BMI 34.90 kg/m²         Physical Exam  Constitutional:       Appearance: She is well-developed. HENT:      Head: Normocephalic and atraumatic. Eyes:      Conjunctiva/sclera: Conjunctivae normal.   Neck:      Thyroid: No thyromegaly. Vascular: No JVD. Trachea: No tracheal deviation. Cardiovascular:      Rate and Rhythm: Normal rate and regular rhythm. Chest Wall: PMI is not displaced. Pulses: No decreased pulses. Heart sounds: Murmur heard. Early systolic murmur is present with a grade of 2/6 at the upper right sternal border. No gallop. Pulmonary:      Effort: No respiratory distress. Breath sounds: No wheezing or rales. Chest:      Chest wall: No tenderness. Abdominal:      Palpations: Abdomen is soft. Tenderness: There is no abdominal tenderness. Musculoskeletal:      Cervical back: Neck supple. Skin:     General: Skin is warm. Neurological:      Mental Status: She is alert and oriented to person, place, and time. Ms. Meg Guardado has a reminder for a \"due or due soon\" health maintenance. I have asked that she contact her primary care provider for follow-up on this health maintenance. CARDIOLOGY STUDIES 1/30/2013   Myocardial Perfusion Scan Result 3/07 mild ant wall defect with partial reversibility;   Echocardiogram - Complete Result 3/10 NORMAL EF,MILD MR,MILD TR;    SUMMARY:2016  Left ventricle: The cavity was small.  Systolic function was normal.  Ejection fraction was estimated to be 65 %. No obvious wall motion  abnormalities identified in the views obtained. There was mild concentric  hypertrophy. Doppler parameters were consistent with abnormal left  ventricular relaxation (grade 1 diastolic dysfunction). Mitral valve: There was trivial regurgitation. Tricuspid valve: Tricuspid regurgitation peak velocity: 2 m/sec. Pulmonary  artery systolic pressure: 19 mmHg. Echo   9/2022   Interpretation Summary         Left Ventricle: Normal left ventricular systolic function with a visually estimated EF of 60 - 65%. Left ventricle size is normal. Increased wall thickness. Findings consistent with mild concentric hypertrophy. Normal wall motion. Comparison Study Information    Prior Study    There is a prior study available for comparison. Prior study date: 3/26/2010. Echo Findings    Left Ventricle Normal left ventricular systolic function with a visually estimated EF of 60 - 65%. Left ventricle size is normal. Increased wall thickness. Findings consistent with mild concentric hypertrophy. Normal wall motion. Indeterminate diastolic function. Left Atrium Left atrium size is normal. LA Vol Index A/L is 31 mL/m2. Right Ventricle Right ventricle is dilated. Normal wall thickness. Normal systolic function. Right Atrium Right atrium size is normal.   Aortic Valve Valve structure is normal. No regurgitation. No stenosis. Mitral Valve Valve structure is normal. No regurgitation. No stenosis noted. Tricuspid Valve Valve structure is normal. Trace regurgitation. No stenosis noted. Pulmonic Valve Valve structure is normal. Trace regurgitation. No stenosis noted. Pulmonary Artery Pulmonary hypertension not present. Aorta Normal sized aortic root. IVC/Hepatic Veins IVC diameter is less than or equal to 21 mm and decreases greater than 50% during inspiration; therefore the estimated right atrial pressure is normal (~3 mmHg). Pericardium No pericardial effusion. Assessment         ICD-10-CM ICD-9-CM    1. Atherosclerosis of native coronary artery of native heart without angina pectoris  I25.10 414.01     Stable continue treatment monitor              2. Chronic diastolic heart failure (HCC)  I50.32 428.32     Stable compensated class II      3. Essential hypertension, benign  I10 401.1     Controlled continue treatment      4. Hyperlipidemia, unspecified hyperlipidemia type  E78.5 272.4     Continue treatment lab with PCP      5. 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 Lab with PCP  2/2020  Cardiac status stable. Recovering from bronchitis. Otherwise clinically stable  8/2020  Cardiac status stable continue current medical management  2/2021  Cardiac status stable continue current medical management. Mildly elevated systolic pressure which will monitor  8/2021  Stable cardiac status. Blood pressure control. Shortness of breath stable continue current medical management  2/2022  Stable cardiac status. Blood pressure controlled. Shortness of breath on activity stable pattern. Recent lab reviewed. LDL 55 into 2022. Hemoglobin A1c is mildly elevated at 6.5  8/2022  1 episode of fatigue and shortness of breath on minimal activity. We will follow-up echo. Blood pressure controlled continue treatment  10/2022  Patient continues to c/o mild fatigue in AM.  Echocardiogram reviewed and discussed with patient normal LV function. No hemodynamically significant valvular pathology. Blood pressure is controlled we will continue current medications and monitor. Medications Discontinued During This Encounter   Medication Reason    letrozole ([address] [facility]) 2.5 mg tablet Therapy Completed    IBUPROFEN PO Therapy Completed       No orders of the defined types were placed in this encounter.         Follow-up and Dispositions    Return in about 6 months (around 4/6/2023) for Follow up with Dr. Kia Gardner.

## 2023-01-03 RX ORDER — METOPROLOL SUCCINATE 50 MG/1
TABLET, EXTENDED RELEASE ORAL
Qty: 90 TABLET | Refills: 3 | Status: SHIPPED | OUTPATIENT
Start: 2023-01-03

## 2023-01-18 RX ORDER — OLMESARTAN MEDOXOMIL AND HYDROCHLOROTHIAZIDE 40/12.5 40; 12.5 MG/1; MG/1
TABLET ORAL
Qty: 90 TABLET | Refills: 1 | Status: SHIPPED | OUTPATIENT
Start: 2023-01-18

## 2023-04-03 RX ORDER — ATORVASTATIN CALCIUM 10 MG/1
TABLET, FILM COATED ORAL
Qty: 90 TABLET | Refills: 1 | Status: SHIPPED | OUTPATIENT
Start: 2023-04-03

## 2023-04-27 ENCOUNTER — OFFICE VISIT (OUTPATIENT)
Age: 88
End: 2023-04-27
Payer: MEDICARE

## 2023-04-27 VITALS
DIASTOLIC BLOOD PRESSURE: 60 MMHG | SYSTOLIC BLOOD PRESSURE: 120 MMHG | HEART RATE: 68 BPM | HEIGHT: 63 IN | WEIGHT: 198 LBS | OXYGEN SATURATION: 96 % | BODY MASS INDEX: 35.08 KG/M2

## 2023-04-27 DIAGNOSIS — E78.2 MIXED HYPERLIPIDEMIA: ICD-10-CM

## 2023-04-27 DIAGNOSIS — I50.32 CHRONIC DIASTOLIC (CONGESTIVE) HEART FAILURE (HCC): ICD-10-CM

## 2023-04-27 DIAGNOSIS — I25.10 ATHEROSCLEROSIS OF NATIVE CORONARY ARTERY OF NATIVE HEART WITHOUT ANGINA PECTORIS: Primary | ICD-10-CM

## 2023-04-27 DIAGNOSIS — I10 ESSENTIAL (PRIMARY) HYPERTENSION: ICD-10-CM

## 2023-04-27 DIAGNOSIS — I08.0 RHEUMATIC DISORDERS OF BOTH MITRAL AND AORTIC VALVES: ICD-10-CM

## 2023-04-27 PROCEDURE — 1036F TOBACCO NON-USER: CPT | Performed by: NURSE PRACTITIONER

## 2023-04-27 PROCEDURE — G8427 DOCREV CUR MEDS BY ELIG CLIN: HCPCS | Performed by: NURSE PRACTITIONER

## 2023-04-27 PROCEDURE — 1123F ACP DISCUSS/DSCN MKR DOCD: CPT | Performed by: NURSE PRACTITIONER

## 2023-04-27 PROCEDURE — 99214 OFFICE O/P EST MOD 30 MIN: CPT | Performed by: NURSE PRACTITIONER

## 2023-04-27 PROCEDURE — 1090F PRES/ABSN URINE INCON ASSESS: CPT | Performed by: NURSE PRACTITIONER

## 2023-04-27 PROCEDURE — G8417 CALC BMI ABV UP PARAM F/U: HCPCS | Performed by: NURSE PRACTITIONER

## 2023-04-27 RX ORDER — IBUPROFEN 200 MG
200 TABLET ORAL PRN
COMMUNITY

## 2023-04-27 RX ORDER — ACETAMINOPHEN 500 MG
500 TABLET ORAL PRN
COMMUNITY

## 2023-04-27 NOTE — PROGRESS NOTES
1. Have you been to the ER, urgent care clinic since your last visit? Hospitalized since your last visit? No    2. Have you seen or consulted any other health care providers outside of the [address] [place] [address] since your last visit? Include any pap smears or colon screening. No     3. Do you need any refills today?   no
with patient normal LV function. No hemodynamically significant valvular pathology. Blood pressure is controlled we will continue current medications and monitor. 4/27/2023  Cardiac status is stable. B/P controlled, continue current medications. Mild ankle edema, advised to elevate when able. No orders of the defined types were placed in this encounter.

## 2023-07-13 ENCOUNTER — HOSPITAL ENCOUNTER (OUTPATIENT)
Facility: HOSPITAL | Age: 88
Discharge: HOME | End: 2023-07-13
Payer: MEDICARE

## 2023-07-13 DIAGNOSIS — Z12.31 SCREENING MAMMOGRAM FOR HIGH-RISK PATIENT: ICD-10-CM

## 2023-07-13 DIAGNOSIS — C50.411 MALIGNANT NEOPLASM OF UPPER-OUTER QUADRANT OF RIGHT FEMALE BREAST, UNSPECIFIED ESTROGEN RECEPTOR STATUS (HCC): ICD-10-CM

## 2023-07-13 PROCEDURE — 77063 BREAST TOMOSYNTHESIS BI: CPT

## 2023-08-28 RX ORDER — OLMESARTAN MEDOXOMIL AND HYDROCHLOROTHIAZIDE 40/12.5 40; 12.5 MG/1; MG/1
TABLET ORAL
Qty: 90 TABLET | Refills: 3 | Status: SHIPPED | OUTPATIENT
Start: 2023-08-28

## 2023-09-29 RX ORDER — ATORVASTATIN CALCIUM 10 MG/1
TABLET, FILM COATED ORAL
Qty: 90 TABLET | Refills: 1 | Status: SHIPPED | OUTPATIENT
Start: 2023-09-29

## 2023-10-12 ENCOUNTER — OFFICE VISIT (OUTPATIENT)
Age: 88
End: 2023-10-12
Payer: MEDICARE

## 2023-10-12 VITALS
WEIGHT: 195 LBS | DIASTOLIC BLOOD PRESSURE: 58 MMHG | HEART RATE: 71 BPM | HEIGHT: 63 IN | SYSTOLIC BLOOD PRESSURE: 119 MMHG | BODY MASS INDEX: 34.55 KG/M2 | OXYGEN SATURATION: 96 %

## 2023-10-12 DIAGNOSIS — E78.2 MIXED HYPERLIPIDEMIA: ICD-10-CM

## 2023-10-12 DIAGNOSIS — I50.32 CHRONIC DIASTOLIC (CONGESTIVE) HEART FAILURE (HCC): ICD-10-CM

## 2023-10-12 DIAGNOSIS — I25.10 ATHEROSCLEROSIS OF NATIVE CORONARY ARTERY OF NATIVE HEART WITHOUT ANGINA PECTORIS: Primary | ICD-10-CM

## 2023-10-12 DIAGNOSIS — I10 ESSENTIAL (PRIMARY) HYPERTENSION: ICD-10-CM

## 2023-10-12 DIAGNOSIS — I08.0 MITRAL AND AORTIC REGURGITATION: ICD-10-CM

## 2023-10-12 PROCEDURE — G8484 FLU IMMUNIZE NO ADMIN: HCPCS | Performed by: INTERNAL MEDICINE

## 2023-10-12 PROCEDURE — 1036F TOBACCO NON-USER: CPT | Performed by: INTERNAL MEDICINE

## 2023-10-12 PROCEDURE — 99214 OFFICE O/P EST MOD 30 MIN: CPT | Performed by: INTERNAL MEDICINE

## 2023-10-12 PROCEDURE — 1090F PRES/ABSN URINE INCON ASSESS: CPT | Performed by: INTERNAL MEDICINE

## 2023-10-12 PROCEDURE — G8427 DOCREV CUR MEDS BY ELIG CLIN: HCPCS | Performed by: INTERNAL MEDICINE

## 2023-10-12 PROCEDURE — G8417 CALC BMI ABV UP PARAM F/U: HCPCS | Performed by: INTERNAL MEDICINE

## 2023-10-12 PROCEDURE — 1123F ACP DISCUSS/DSCN MKR DOCD: CPT | Performed by: INTERNAL MEDICINE

## 2023-10-12 RX ORDER — ASPIRIN 81 MG/1
81 TABLET ORAL DAILY
COMMUNITY

## 2023-10-12 NOTE — PROGRESS NOTES
HISTORY OF PRESENT ILLNESS   Ministerio Salazar is a 81 y.o. female. Follow-up   Associated symptoms include shortness of breath. Pertinent negatives include no chest  pain, no abdominal pain and no headaches. CHF   The history is provided by the Patient. This is a chronic problem. The problem has not changed since onset. Associated symptoms include shortness of breath. Pertinent negatives include no chest pain, no abdominal pain and no headaches. The symptoms are aggravated by exertion. Valvular Heart Disease   The history is provided by the Patient. This is a chronic problem. The problem occurs constantly. The problem has been gradually  improving. Associated symptoms include shortness of breath. Pertinent negatives include no chest pain, no abdominal pain and no headaches. Shortness of Breath   The history is provided by the Patient. The problem occurs intermittently. The problem has been gradually improving. Pertinent negatives include no fever, no headaches, no cough, no sputum production, no hemoptysis, no wheezing, no PND, no orthopnea,  no chest pain, no vomiting, no abdominal pain, no rash, no leg swelling and no claudication. The problem's precipitants include exercise (exertion). Review of Systems    Constitutional:  Negative for chills and fever. HENT:  Negative for nosebleeds. Eyes:  Negative for blurred vision and double vision. Respiratory: Negative for shortness of breath. Negative for cough, hemoptysis, sputum production and wheezing. Cardiovascular:  Positive for mild ankle edema. Negative for chest pain, palpitations, orthopnea, claudication, and PND. Gastrointestinal:  Negative for abdominal pain, heartburn, nausea and vomiting. Musculoskeletal:  Negative for myalgias. Skin:  Negative for rash. Neurological:  Negative for dizziness, weakness and headaches. Endo/Heme/Allergies:  Does not bruise/bleed easily.        Family History   Problem

## 2023-10-12 NOTE — PROGRESS NOTES
1. Have you been to the ER, urgent care clinic since your last visit? Hospitalized since your last visit? No    2. Have you seen or consulted any other health care providers outside of the [address] [place] [address] since your last visit? Include any pap smears or colon screening.  Yes Where: PCP Reason for visit: Routine Visit

## 2023-10-17 RX ORDER — METOPROLOL SUCCINATE 50 MG/1
TABLET, EXTENDED RELEASE ORAL
Qty: 90 TABLET | Refills: 3 | Status: SHIPPED | OUTPATIENT
Start: 2023-10-17

## 2024-03-27 RX ORDER — ATORVASTATIN CALCIUM 10 MG/1
TABLET, FILM COATED ORAL
Qty: 90 TABLET | Refills: 1 | Status: SHIPPED | OUTPATIENT
Start: 2024-03-27

## 2024-05-13 ENCOUNTER — OFFICE VISIT (OUTPATIENT)
Age: 89
End: 2024-05-13
Payer: MEDICARE

## 2024-05-13 VITALS
OXYGEN SATURATION: 96 % | BODY MASS INDEX: 35.61 KG/M2 | HEART RATE: 68 BPM | DIASTOLIC BLOOD PRESSURE: 62 MMHG | SYSTOLIC BLOOD PRESSURE: 111 MMHG | HEIGHT: 63 IN | WEIGHT: 201 LBS

## 2024-05-13 DIAGNOSIS — I25.10 ATHEROSCLEROSIS OF NATIVE CORONARY ARTERY OF NATIVE HEART WITHOUT ANGINA PECTORIS: Primary | ICD-10-CM

## 2024-05-13 DIAGNOSIS — E78.2 MIXED HYPERLIPIDEMIA: ICD-10-CM

## 2024-05-13 DIAGNOSIS — I50.32 CHRONIC DIASTOLIC (CONGESTIVE) HEART FAILURE (HCC): ICD-10-CM

## 2024-05-13 DIAGNOSIS — I10 ESSENTIAL (PRIMARY) HYPERTENSION: ICD-10-CM

## 2024-05-13 PROCEDURE — 1123F ACP DISCUSS/DSCN MKR DOCD: CPT | Performed by: NURSE PRACTITIONER

## 2024-05-13 PROCEDURE — 1090F PRES/ABSN URINE INCON ASSESS: CPT | Performed by: NURSE PRACTITIONER

## 2024-05-13 PROCEDURE — G8417 CALC BMI ABV UP PARAM F/U: HCPCS | Performed by: NURSE PRACTITIONER

## 2024-05-13 PROCEDURE — 99214 OFFICE O/P EST MOD 30 MIN: CPT | Performed by: NURSE PRACTITIONER

## 2024-05-13 PROCEDURE — 1036F TOBACCO NON-USER: CPT | Performed by: NURSE PRACTITIONER

## 2024-05-13 PROCEDURE — G8427 DOCREV CUR MEDS BY ELIG CLIN: HCPCS | Performed by: NURSE PRACTITIONER

## 2024-05-13 PROCEDURE — 93000 ELECTROCARDIOGRAM COMPLETE: CPT | Performed by: NURSE PRACTITIONER

## 2024-05-13 NOTE — PROGRESS NOTES
HISTORY OF PRESENT ILLNESS   Juliette Treviño is a 87 y.o. female.       Follow-up   Associated symptoms include shortness of breath. Pertinent negatives include no chest  pain, no abdominal pain and no headaches.    CHF   The history is provided by the Patient. This is a chronic problem. The problem has not changed since onset.Associated symptoms include shortness of breath. Pertinent negatives include no chest pain, no abdominal pain and no headaches.  The symptoms are aggravated by exertion.    Valvular Heart Disease   The history is provided by the Patient. This is a chronic problem. The problem occurs constantly. The problem has been gradually  improving. Associated symptoms include shortness of breath. Pertinent negatives include no chest pain, no abdominal pain and no headaches.    Shortness of Breath   The history is provided by the Patient. The problem occurs intermittently.The problem has been gradually improving. Pertinent negatives include no fever, no headaches, no cough, no sputum production, no hemoptysis, no wheezing, no PND, no orthopnea,  no chest pain, no vomiting, no abdominal pain, no rash, no leg swelling and no claudication. The problem's precipitants include exercise (exertion).       Review of Systems    Constitutional:  Negative for chills and fever.    HENT:  Negative for nosebleeds.     Eyes:  Negative for blurred vision and double vision.    Respiratory: Negative for shortness of breath. Negative for cough, hemoptysis, sputum production and wheezing.     Cardiovascular:  Positive for mild ankle edema. Negative for chest pain, palpitations, orthopnea, claudication, and PND.    Gastrointestinal:  Negative for abdominal pain, heartburn, nausea and vomiting.    Musculoskeletal:  Negative for myalgias.    Skin:  Negative for rash.    Neurological:  Negative for dizziness, weakness and headaches.    Endo/Heme/Allergies:  Does not bruise/bleed easily.       Family History   Problem

## 2024-06-25 ENCOUNTER — TRANSCRIBE ORDERS (OUTPATIENT)
Facility: HOSPITAL | Age: 89
End: 2024-06-25

## 2024-06-25 DIAGNOSIS — Z12.31 SCREENING MAMMOGRAM FOR HIGH-RISK PATIENT: Primary | ICD-10-CM

## 2024-07-18 ENCOUNTER — HOSPITAL ENCOUNTER (OUTPATIENT)
Facility: HOSPITAL | Age: 89
Discharge: HOME | End: 2024-07-18
Attending: INTERNAL MEDICINE
Payer: MEDICARE

## 2024-07-18 DIAGNOSIS — Z12.31 SCREENING MAMMOGRAM FOR HIGH-RISK PATIENT: ICD-10-CM

## 2024-07-18 PROCEDURE — 77063 BREAST TOMOSYNTHESIS BI: CPT

## 2024-08-01 ENCOUNTER — HOSPITAL ENCOUNTER (OUTPATIENT)
Facility: HOSPITAL | Age: 89
Discharge: HOME | End: 2024-08-01
Attending: INTERNAL MEDICINE
Payer: MEDICARE

## 2024-08-01 DIAGNOSIS — Z78.0 POSTMENOPAUSAL: ICD-10-CM

## 2024-08-01 PROCEDURE — 77080 DXA BONE DENSITY AXIAL: CPT

## 2024-09-26 RX ORDER — ATORVASTATIN CALCIUM 10 MG/1
TABLET, FILM COATED ORAL
Qty: 90 TABLET | Refills: 1 | Status: SHIPPED | OUTPATIENT
Start: 2024-09-26

## 2024-11-06 ENCOUNTER — OFFICE VISIT (OUTPATIENT)
Age: 89
End: 2024-11-06
Payer: MEDICARE

## 2024-11-06 VITALS
WEIGHT: 194 LBS | DIASTOLIC BLOOD PRESSURE: 60 MMHG | HEART RATE: 71 BPM | HEIGHT: 63 IN | OXYGEN SATURATION: 96 % | SYSTOLIC BLOOD PRESSURE: 127 MMHG | BODY MASS INDEX: 34.38 KG/M2

## 2024-11-06 DIAGNOSIS — I50.32 CHRONIC HEART FAILURE WITH PRESERVED EJECTION FRACTION (HCC): Primary | ICD-10-CM

## 2024-11-06 DIAGNOSIS — E78.49 OTHER HYPERLIPIDEMIA: ICD-10-CM

## 2024-11-06 DIAGNOSIS — I10 HYPERTENSION, UNSPECIFIED TYPE: ICD-10-CM

## 2024-11-06 DIAGNOSIS — I25.83 CORONARY ARTERY DISEASE DUE TO LIPID RICH PLAQUE: ICD-10-CM

## 2024-11-06 DIAGNOSIS — I25.10 CORONARY ARTERY DISEASE DUE TO LIPID RICH PLAQUE: ICD-10-CM

## 2024-11-06 PROCEDURE — G8417 CALC BMI ABV UP PARAM F/U: HCPCS | Performed by: INTERNAL MEDICINE

## 2024-11-06 PROCEDURE — G8428 CUR MEDS NOT DOCUMENT: HCPCS | Performed by: INTERNAL MEDICINE

## 2024-11-06 PROCEDURE — G8484 FLU IMMUNIZE NO ADMIN: HCPCS | Performed by: INTERNAL MEDICINE

## 2024-11-06 PROCEDURE — 99214 OFFICE O/P EST MOD 30 MIN: CPT | Performed by: INTERNAL MEDICINE

## 2024-11-06 PROCEDURE — 1036F TOBACCO NON-USER: CPT | Performed by: INTERNAL MEDICINE

## 2024-11-06 PROCEDURE — 1123F ACP DISCUSS/DSCN MKR DOCD: CPT | Performed by: INTERNAL MEDICINE

## 2024-11-06 PROCEDURE — 1090F PRES/ABSN URINE INCON ASSESS: CPT | Performed by: INTERNAL MEDICINE

## 2024-11-06 RX ORDER — OLMESARTAN MEDOXOMIL AND HYDROCHLOROTHIAZIDE 40/12.5 40; 12.5 MG/1; MG/1
1 TABLET ORAL DAILY
Qty: 90 TABLET | Refills: 1 | Status: SHIPPED | OUTPATIENT
Start: 2024-11-06

## 2024-11-06 ASSESSMENT — ENCOUNTER SYMPTOMS
CONSTIPATION: 0
BLOOD IN STOOL: 0
ABDOMINAL PAIN: 0
SHORTNESS OF BREATH: 0
CHEST TIGHTNESS: 0
COLOR CHANGE: 0
APNEA: 0
COUGH: 0
NAUSEA: 0
WHEEZING: 0
DIARRHEA: 0

## 2024-11-06 NOTE — PROGRESS NOTES
Have you had Fatigue?  No     2.   Have you had have you had Chest Pain? No     3.   Have you had Dyspnea (SOB) ?  no  4.   Have you had Orthopnea? No     5.   Have you had PND? No   6.   Have you had leg swelling? No  7.    Have you had any weight gain? No     8. Have you had any palpitations? No    9. Have you had any syncope? No   10. Do you have any wounds on legs?no  
Neck supple.      Right lower leg: No edema.      Left lower leg: No edema.   Skin:     General: Skin is warm and dry.      Findings: No erythema or rash.   Neurological:      Mental Status: She is alert. Mental status is at baseline.      Motor: No weakness.      Gait: Gait normal.   Psychiatric:         Mood and Affect: Mood normal.         Behavior: Behavior normal.         Thought Content: Thought content normal.         ASSESSMENT and PLAN  Ms. Treviño has a reminder for a \"due or due soon\" health maintenance. I have asked that she contact her primary care provider for follow-up on this health maintenance.    Chronic heart failure preserved ejection fraction - Appears euvolemic, continue with 2g of salt per day and 2L of fluid per day, continue with hctz 12.5m gpo daily.      Abnormal stress test - Continue with aspirin 81mg po daily, Continue with lipitor 10mg po daily    HTN - Normotensive, continue with olmesartan 40mg po daily, continue with hctz 12.5mg po daily.      HLD - Continue with lipitor 10mg po daily, survey lipid panel periodically.

## 2024-11-06 NOTE — PATIENT INSTRUCTIONS
Learning About the Mediterranean Diet  What is the Mediterranean diet?     The Mediterranean diet is a style of eating rather than a diet plan. It features foods eaten in [place], [place], [place], and other countries along the [place] Sea. It emphasizes eating foods like fish, fruits, vegetables, beans, high-fiber breads and whole grains, nuts, and olive oil. This style of eating includes limited red meat, cheese, and sweets.  Why choose the Mediterranean diet?  A Mediterranean-style diet may improve heart health. It contains more fat than other heart-healthy diets. But the fats are mainly from nuts, unsaturated oils (such as fish oils and olive oil), and certain nut or seed oils (such as canola, soybean, or flaxseed oil). These fats may help protect the heart and blood vessels.  How can you get started on the Mediterranean diet?  Here are some things you can do to switch to a more Mediterranean way of eating.  What to eat  Eat a variety of fruits and vegetables each day, such as grapes, blueberries, tomatoes, broccoli, peppers, figs, olives, spinach, eggplant, beans, lentils, and chickpeas.  Eat a variety of whole-grain foods each day, such as oats, brown rice, and whole wheat bread, pasta, and couscous.  Eat fish at least 2 times a week. Try tuna, salmon, mackerel, lake trout, herring, or sardines.  Eat moderate amounts of low-fat dairy products, such as milk, cheese, or yogurt.  Eat moderate amounts of poultry and eggs.  Choose healthy (unsaturated) fats, such as nuts, olive oil, and certain nut or seed oils like canola, soybean, and flaxseed.  Limit unhealthy (saturated) fats, such as butter, palm oil, and coconut oil. And limit fats found in animal products, such as meat and dairy products made with whole milk. Try to eat red meat only a few times a month in very small amounts.  Limit sweets and desserts to only a few times a week. This includes sugar-sweetened drinks like soda.  The

## 2024-11-19 ENCOUNTER — TELEPHONE (OUTPATIENT)
Facility: CLINIC | Age: 89
End: 2024-11-19

## 2024-12-26 RX ORDER — METOPROLOL SUCCINATE 50 MG/1
50 TABLET, EXTENDED RELEASE ORAL DAILY
Qty: 90 TABLET | Refills: 3 | Status: SHIPPED | OUTPATIENT
Start: 2024-12-26

## 2025-01-09 ENCOUNTER — TELEPHONE (OUTPATIENT)
Facility: CLINIC | Age: 89
End: 2025-01-09

## 2025-01-09 NOTE — TELEPHONE ENCOUNTER
Pt daughter inquired on medical records begin sent here from previous provider, nothing scanned into chart

## 2025-01-16 ENCOUNTER — OFFICE VISIT (OUTPATIENT)
Facility: CLINIC | Age: 89
End: 2025-01-16

## 2025-01-16 VITALS
HEIGHT: 63 IN | DIASTOLIC BLOOD PRESSURE: 68 MMHG | HEART RATE: 67 BPM | OXYGEN SATURATION: 96 % | WEIGHT: 195 LBS | BODY MASS INDEX: 34.55 KG/M2 | SYSTOLIC BLOOD PRESSURE: 136 MMHG

## 2025-01-16 DIAGNOSIS — E78.5 HYPERLIPIDEMIA, UNSPECIFIED HYPERLIPIDEMIA TYPE: ICD-10-CM

## 2025-01-16 DIAGNOSIS — I10 ESSENTIAL HYPERTENSION: ICD-10-CM

## 2025-01-16 DIAGNOSIS — I50.32 CHRONIC DIASTOLIC CONGESTIVE HEART FAILURE (HCC): ICD-10-CM

## 2025-01-16 DIAGNOSIS — I25.10 CORONARY ARTERY DISEASE INVOLVING NATIVE CORONARY ARTERY OF NATIVE HEART WITHOUT ANGINA PECTORIS: ICD-10-CM

## 2025-01-16 DIAGNOSIS — Z87.39 HISTORY OF GOUT: ICD-10-CM

## 2025-01-16 DIAGNOSIS — Z85.3 HISTORY OF BREAST CANCER: ICD-10-CM

## 2025-01-16 DIAGNOSIS — E55.9 VITAMIN D DEFICIENCY: ICD-10-CM

## 2025-01-16 DIAGNOSIS — Z00.00 ENCOUNTER FOR MEDICAL EXAMINATION TO ESTABLISH CARE: ICD-10-CM

## 2025-01-16 DIAGNOSIS — R41.3 MEMORY DEFICIT: Primary | ICD-10-CM

## 2025-01-16 DIAGNOSIS — Z86.39 HISTORY OF HYPOTHYROIDISM: ICD-10-CM

## 2025-01-16 DIAGNOSIS — R73.03 PREDIABETES: ICD-10-CM

## 2025-01-16 SDOH — ECONOMIC STABILITY: FOOD INSECURITY: WITHIN THE PAST 12 MONTHS, YOU WORRIED THAT YOUR FOOD WOULD RUN OUT BEFORE YOU GOT MONEY TO BUY MORE.: NEVER TRUE

## 2025-01-16 SDOH — ECONOMIC STABILITY: FOOD INSECURITY: WITHIN THE PAST 12 MONTHS, THE FOOD YOU BOUGHT JUST DIDN'T LAST AND YOU DIDN'T HAVE MONEY TO GET MORE.: NEVER TRUE

## 2025-01-16 ASSESSMENT — ENCOUNTER SYMPTOMS
COUGH: 0
VOMITING: 0
RHINORRHEA: 0
CHEST TIGHTNESS: 0
DIARRHEA: 0
ABDOMINAL PAIN: 0
WHEEZING: 0
SHORTNESS OF BREATH: 0
BLOOD IN STOOL: 0
NAUSEA: 0

## 2025-01-16 ASSESSMENT — PATIENT HEALTH QUESTIONNAIRE - PHQ9
SUM OF ALL RESPONSES TO PHQ QUESTIONS 1-9: 0
SUM OF ALL RESPONSES TO PHQ QUESTIONS 1-9: 0
1. LITTLE INTEREST OR PLEASURE IN DOING THINGS: NOT AT ALL
2. FEELING DOWN, DEPRESSED OR HOPELESS: NOT AT ALL
SUM OF ALL RESPONSES TO PHQ QUESTIONS 1-9: 0
SUM OF ALL RESPONSES TO PHQ9 QUESTIONS 1 & 2: 0
SUM OF ALL RESPONSES TO PHQ QUESTIONS 1-9: 0

## 2025-01-16 NOTE — PROGRESS NOTES
Juliette Treviño is a 89 y.o. female presenting today for New Patient (PT presents to establish care.  )  .     Chief Complaint   Patient presents with    New Patient     PT presents to establish care.         HPI:  Juliette Treviño presents to the office today to establish care.     Patient has a history of  HTN, HLD, hypothyroidism, CAD, HFpEF, prediabetes, RCC, breast cancer.    Patient has a history of right breast cancer-s/p right partial  mastectomy..  Following with Dr. Choudhary.   She is s/p hysterectomy, oophorectomy, right nephrectomy for RCC,    Bone density in 8/2024 was within normal limits.    CAD/CHF: Patient is following with cardiology.  She is on aspirin 81 mg daily, Lipitor 10 mg daily.  She has been advised fluid and salt restriction.    HTN: Patient is currently taking HCTZ 12.5 mg daily and olmesartan 40 mg daily.  BP is controlled.    Vitamin D deficiency: Patient was taking high-dose vitamin D supplements with level increasing to 102.  She was advised to take 100 IU twice daily after that.  Has not had a repeat level checked.    Patient noted to have memory deficits, decreased short-term recall.  Prior notes document history of hypothyroidism but patient is not on any thyroid supplements.  She has been taking Prevagen and multivitamins.  Mini cog today was word recall 1/3, clock 0/2    Labs in 10/2024:  WBC 5.7, hemoglobin 12.6, platelets 130, glucose 123, sodium 141, potassium 4.0, creatinine 0.99, LFTs normal, triglyceride 76, HDL 28, LDL 56, vitamin D 102      Review of Systems   Constitutional:  Negative for activity change, appetite change, chills, diaphoresis, fatigue, fever and unexpected weight change.   HENT:  Negative for congestion, nosebleeds, postnasal drip and rhinorrhea.    Respiratory:  Negative for cough, chest tightness, shortness of breath and wheezing.    Cardiovascular:  Negative for chest pain and leg swelling.   Gastrointestinal:  Negative for abdominal pain, blood

## 2025-01-16 NOTE — PROGRESS NOTES
Juliette Treviño is a 89 y.o. year old female who presents today for No chief complaint on file.      Is someone accompanying this pt? Grand daughter Kelsie CARLISLE    Is the patient using any DME equipment during OV? No     Depression Screenin/16/2025    10:32 AM 10/6/2022    12:38 PM 2021    10:17 AM   PHQ-9 Questionaire   Little interest or pleasure in doing things 0 0 1   Feeling down, depressed, or hopeless 0 0 0   PHQ-9 Total Score 0 0 1       Abuse Screening:       No data to display                Learning Assessment:  No question data found.    Fall Risk:      2025    10:31 AM   Fall Risk   Do you feel unsteady or are you worried about falling?  no   2 or more falls in past year? no   Fall with injury in past year? no           Coordination of Care:   1. \"Have you been to the ER, urgent care clinic since your last visit?  Hospitalized since your last visit?\" No    2. \"Have you seen or consulted any other health care providers outside of the [facility] since your last visit?\"  No     3. For patients aged 45-75: Has the patient had a colonoscopy / FIT/ Cologuard?      If the patient is female:    4. For patients aged 40-74: Has the patient had a mammogram within the past 2 years? Yes     5. For patients aged 21-65: Has the patient had a pap smear?      Health Maintenance: reviewed and discussed and ordered per Provider.    Health Maintenance Due   Topic Date Due    Pneumococcal 65+ years Vaccine (1 of 2 - PCV) Never done    Lipids  Never done    Depression Screen  Never done    DTaP/Tdap/Td vaccine (1 - Tdap) Never done    Shingles vaccine (1 of 2) Never done    Respiratory Syncytial Virus (RSV) Pregnant or age 60 yrs+ (1 - 1-dose 75+ series) Never done    Annual Wellness Visit (Medicare)  Never done    Flu vaccine (1) Never done    COVID-19 Vaccine ( - - season) Never done        -LULA RABAGO   [facility]        Click Here for Release of Records Request

## 2025-03-03 ENCOUNTER — HOSPITAL ENCOUNTER (OUTPATIENT)
Facility: HOSPITAL | Age: 89
Discharge: HOME | End: 2025-03-06
Payer: MEDICARE

## 2025-03-03 ENCOUNTER — OFFICE VISIT (OUTPATIENT)
Facility: CLINIC | Age: 89
End: 2025-03-03
Payer: MEDICARE

## 2025-03-03 ENCOUNTER — TELEPHONE (OUTPATIENT)
Facility: CLINIC | Age: 89
End: 2025-03-03

## 2025-03-03 VITALS
BODY MASS INDEX: 34.38 KG/M2 | OXYGEN SATURATION: 94 % | TEMPERATURE: 97.9 F | DIASTOLIC BLOOD PRESSURE: 74 MMHG | SYSTOLIC BLOOD PRESSURE: 125 MMHG | RESPIRATION RATE: 18 BRPM | HEART RATE: 66 BPM | WEIGHT: 194 LBS | HEIGHT: 63 IN

## 2025-03-03 DIAGNOSIS — M54.50 ACUTE LOW BACK PAIN WITHOUT SCIATICA, UNSPECIFIED BACK PAIN LATERALITY: ICD-10-CM

## 2025-03-03 DIAGNOSIS — S22.080A COMPRESSION FRACTURE OF T11 VERTEBRA, INITIAL ENCOUNTER (HCC): Primary | ICD-10-CM

## 2025-03-03 DIAGNOSIS — M54.50 ACUTE LOW BACK PAIN WITHOUT SCIATICA, UNSPECIFIED BACK PAIN LATERALITY: Primary | ICD-10-CM

## 2025-03-03 LAB
CRP SERPL-MCNC: <0.3 MG/DL (ref 0–0.3)
ERYTHROCYTE [SEDIMENTATION RATE] IN BLOOD: 48 MM/HR (ref 0–30)

## 2025-03-03 PROCEDURE — 99214 OFFICE O/P EST MOD 30 MIN: CPT | Performed by: STUDENT IN AN ORGANIZED HEALTH CARE EDUCATION/TRAINING PROGRAM

## 2025-03-03 PROCEDURE — 86140 C-REACTIVE PROTEIN: CPT

## 2025-03-03 PROCEDURE — 1126F AMNT PAIN NOTED NONE PRSNT: CPT | Performed by: STUDENT IN AN ORGANIZED HEALTH CARE EDUCATION/TRAINING PROGRAM

## 2025-03-03 PROCEDURE — 85652 RBC SED RATE AUTOMATED: CPT

## 2025-03-03 PROCEDURE — 72074 X-RAY EXAM THORAC SPINE4/>VW: CPT

## 2025-03-03 PROCEDURE — 1123F ACP DISCUSS/DSCN MKR DOCD: CPT | Performed by: STUDENT IN AN ORGANIZED HEALTH CARE EDUCATION/TRAINING PROGRAM

## 2025-03-03 PROCEDURE — 1159F MED LIST DOCD IN RCRD: CPT | Performed by: STUDENT IN AN ORGANIZED HEALTH CARE EDUCATION/TRAINING PROGRAM

## 2025-03-03 PROCEDURE — 36415 COLL VENOUS BLD VENIPUNCTURE: CPT

## 2025-03-03 PROCEDURE — 72110 X-RAY EXAM L-2 SPINE 4/>VWS: CPT

## 2025-03-03 RX ORDER — CALCITONIN SALMON 200 [IU]/.09ML
1 SPRAY, METERED NASAL DAILY
Qty: 3.7 ML | Refills: 3 | Status: SHIPPED | OUTPATIENT
Start: 2025-03-03

## 2025-03-03 RX ORDER — IBUPROFEN 200 MG
200 TABLET ORAL PRN
Qty: 120 TABLET | Refills: 1 | Status: SHIPPED | OUTPATIENT
Start: 2025-03-03

## 2025-03-03 SDOH — ECONOMIC STABILITY: FOOD INSECURITY: WITHIN THE PAST 12 MONTHS, THE FOOD YOU BOUGHT JUST DIDN'T LAST AND YOU DIDN'T HAVE MONEY TO GET MORE.: NEVER TRUE

## 2025-03-03 SDOH — ECONOMIC STABILITY: FOOD INSECURITY: WITHIN THE PAST 12 MONTHS, YOU WORRIED THAT YOUR FOOD WOULD RUN OUT BEFORE YOU GOT MONEY TO BUY MORE.: NEVER TRUE

## 2025-03-03 ASSESSMENT — ENCOUNTER SYMPTOMS
ABDOMINAL PAIN: 0
ABDOMINAL DISTENTION: 0
SORE THROAT: 0
VOMITING: 0
CHEST TIGHTNESS: 0
SHORTNESS OF BREATH: 0
NAUSEA: 0
DIARRHEA: 0
RHINORRHEA: 0
BACK PAIN: 1

## 2025-03-03 ASSESSMENT — ANXIETY QUESTIONNAIRES
2. NOT BEING ABLE TO STOP OR CONTROL WORRYING: NOT AT ALL
7. FEELING AFRAID AS IF SOMETHING AWFUL MIGHT HAPPEN: NOT AT ALL
5. BEING SO RESTLESS THAT IT IS HARD TO SIT STILL: NOT AT ALL
1. FEELING NERVOUS, ANXIOUS, OR ON EDGE: NOT AT ALL
GAD7 TOTAL SCORE: 0
4. TROUBLE RELAXING: NOT AT ALL
IF YOU CHECKED OFF ANY PROBLEMS ON THIS QUESTIONNAIRE, HOW DIFFICULT HAVE THESE PROBLEMS MADE IT FOR YOU TO DO YOUR WORK, TAKE CARE OF THINGS AT HOME, OR GET ALONG WITH OTHER PEOPLE: NOT DIFFICULT AT ALL
3. WORRYING TOO MUCH ABOUT DIFFERENT THINGS: NOT AT ALL
6. BECOMING EASILY ANNOYED OR IRRITABLE: NOT AT ALL

## 2025-03-03 ASSESSMENT — PATIENT HEALTH QUESTIONNAIRE - PHQ9
1. LITTLE INTEREST OR PLEASURE IN DOING THINGS: NOT AT ALL
SUM OF ALL RESPONSES TO PHQ QUESTIONS 1-9: 0
SUM OF ALL RESPONSES TO PHQ QUESTIONS 1-9: 0
2. FEELING DOWN, DEPRESSED OR HOPELESS: NOT AT ALL
SUM OF ALL RESPONSES TO PHQ QUESTIONS 1-9: 0
SUM OF ALL RESPONSES TO PHQ QUESTIONS 1-9: 0

## 2025-03-03 NOTE — PROGRESS NOTES
\"Have you been to the ER, urgent care clinic since your last visit?  Hospitalized since your last visit?\"    NO    “Have you seen or consulted any other health care providers outside our system since your last visit?”    NO            
MD  Internal Medicine

## 2025-03-03 NOTE — TELEPHONE ENCOUNTER
X-ray showed compression fracture of T11 as well as grade 1 of anterolisthesis of L4 L5. I discussed findings with patients daughter Mrs. Valerie Ann. I prescribed calcitonin nasal spray as well as continuing Ibuprofen for symptomatic relief.

## 2025-03-18 NOTE — LETTER
[id number] 1935 6/28/2017 Dear Cody Hutchinson MD 
 
I had the pleasure of evaluating  Ms. Vanegas in office today. Below are the relevant portions of my assessment and plan of care. ICD-10-CM ICD-9-CM 1. Atherosclerosis of native coronary artery of native heart without angina pectoris I25.10 414.01   
 stable 2. 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   
 stable 
asymptomatic 3. Chronic diastolic heart failure (HCC) I50.32 428.32   
 compenstated 4. Essential hypertension, benign I10 401.1   
 stable 5. Hyperlipidemia, unspecified hyperlipidemia type E78.5 272.4 Current Outpatient Prescriptions Medication Sig Dispense Refill  nitroglycerin (NITROSTAT) 0.4 mg SL tablet 1 Tab by SubLINGual route every five (5) minutes as needed for up to 3 doses. 25 Tab 1  
 metoprolol succinate (TOPROL-XL) 50 mg XL tablet take 1 tablet by mouth once daily 30 Tab 6  
 atorvastatin (LIPITOR) 10 mg tablet take 1 tablet by mouth once daily 30 Tab 6  
 valsartan-hydrochlorothiazide (DIOVAN-HCT) 320-12.5 mg per tablet take 1 tablet by mouth once daily 30 Tab 6  cyanocobalamin 1,000 mcg tablet Take 1,000 mcg by mouth daily.  cholecalciferol (VITAMIN D3) 1,000 unit cap Take  by mouth daily.  cephALEXin (KEFLEX) 500 mg capsule   0  
 valACYclovir (VALTREX) 500 mg tablet 1,000 mg.  0  
 calcium 500 mg Tab Take  by mouth daily.  Omega-3-DHA-EPA-Fish Oil 1,000 (120-180) mg cap Take  by mouth daily.  Cetirizine (ZYRTEC) 10 mg cap Take 10 mg by mouth as needed.  aspirin 81 mg tablet Take 81 mg by mouth daily.  multivitamins with minerals Cap Take  by mouth daily.  letrozole (FEMARA) 2.5 mg tablet Take 2.5 mg by mouth daily.  ACETAMINOPHEN (TYLENOL PO) Take  by mouth as needed.  IBUPROFEN PO Take  by mouth as needed. No orders of the defined types were placed in this encounter. If you have questions, please do not hesitate to call me. I look forward to following Ms. Vanegas along with you. Sincerely, Ammon Romero MD 
 
 
 yes

## 2025-03-27 RX ORDER — ATORVASTATIN CALCIUM 10 MG/1
10 TABLET, FILM COATED ORAL DAILY
Qty: 90 TABLET | Refills: 1 | Status: SHIPPED | OUTPATIENT
Start: 2025-03-27

## 2025-03-30 DIAGNOSIS — S22.080A COMPRESSION FRACTURE OF T11 VERTEBRA, INITIAL ENCOUNTER (HCC): ICD-10-CM

## 2025-03-31 RX ORDER — CALCITONIN SALMON 200 [IU]/.09ML
1 SPRAY, METERED NASAL DAILY
Qty: 12.333 ML | Refills: 0 | Status: SHIPPED | OUTPATIENT
Start: 2025-03-31 | End: 2025-08-15

## 2025-04-17 ENCOUNTER — HOSPITAL ENCOUNTER (OUTPATIENT)
Facility: HOSPITAL | Age: 89
Discharge: HOME | End: 2025-04-20
Payer: MEDICARE

## 2025-04-17 DIAGNOSIS — E78.5 HYPERLIPIDEMIA, UNSPECIFIED HYPERLIPIDEMIA TYPE: ICD-10-CM

## 2025-04-17 DIAGNOSIS — R41.3 MEMORY DEFICIT: ICD-10-CM

## 2025-04-17 DIAGNOSIS — E55.9 VITAMIN D DEFICIENCY: ICD-10-CM

## 2025-04-17 DIAGNOSIS — R73.03 PREDIABETES: ICD-10-CM

## 2025-04-17 DIAGNOSIS — I10 ESSENTIAL HYPERTENSION: ICD-10-CM

## 2025-04-17 DIAGNOSIS — Z86.39 HISTORY OF HYPOTHYROIDISM: ICD-10-CM

## 2025-04-17 DIAGNOSIS — Z87.39 HISTORY OF GOUT: ICD-10-CM

## 2025-04-17 LAB
25(OH)D3 SERPL-MCNC: 76.3 NG/ML (ref 30–100)
ALBUMIN SERPL-MCNC: 3.6 G/DL (ref 3.4–5)
ALBUMIN/GLOB SERPL: 1 (ref 0.8–1.7)
ALP SERPL-CCNC: 116 U/L (ref 45–117)
ALT SERPL-CCNC: 23 U/L (ref 13–56)
ANION GAP SERPL CALC-SCNC: 4 MMOL/L (ref 3–18)
AST SERPL-CCNC: 17 U/L (ref 10–38)
BASOPHILS # BLD: 0.05 K/UL (ref 0–0.1)
BASOPHILS NFR BLD: 0.6 % (ref 0–2)
BILIRUB SERPL-MCNC: 0.5 MG/DL (ref 0.2–1)
BUN SERPL-MCNC: 24 MG/DL (ref 7–18)
BUN/CREAT SERPL: 22 (ref 12–20)
CALCIUM SERPL-MCNC: 9.5 MG/DL (ref 8.5–10.1)
CHLORIDE SERPL-SCNC: 106 MMOL/L (ref 100–111)
CHOLEST SERPL-MCNC: 96 MG/DL
CO2 SERPL-SCNC: 28 MMOL/L (ref 21–32)
CREAT SERPL-MCNC: 1.11 MG/DL (ref 0.6–1.3)
DIFFERENTIAL METHOD BLD: ABNORMAL
EOSINOPHIL # BLD: 0.25 K/UL (ref 0–0.4)
EOSINOPHIL NFR BLD: 3.1 % (ref 0–5)
ERYTHROCYTE [DISTWIDTH] IN BLOOD BY AUTOMATED COUNT: 14.2 % (ref 11.6–14.5)
EST. AVERAGE GLUCOSE BLD GHB EST-MCNC: 126 MG/DL
FOLATE SERPL-MCNC: >20 NG/ML (ref 3.1–17.5)
GLOBULIN SER CALC-MCNC: 3.5 G/DL (ref 2–4)
GLUCOSE SERPL-MCNC: 126 MG/DL (ref 74–99)
HBA1C MFR BLD: 6 % (ref 4.2–5.6)
HCT VFR BLD AUTO: 43.1 % (ref 35–45)
HDLC SERPL-MCNC: 35 MG/DL (ref 40–60)
HDLC SERPL: 2.7 (ref 0–5)
HGB BLD-MCNC: 13.3 G/DL (ref 12–16)
IMM GRANULOCYTES # BLD AUTO: 0.05 K/UL (ref 0–0.04)
IMM GRANULOCYTES NFR BLD AUTO: 0.6 % (ref 0–0.5)
LDLC SERPL CALC-MCNC: 43.6 MG/DL (ref 0–100)
LIPID PANEL: ABNORMAL
LYMPHOCYTES # BLD: 2.47 K/UL (ref 0.9–3.6)
LYMPHOCYTES NFR BLD: 30.7 % (ref 21–52)
MCH RBC QN AUTO: 30.9 PG (ref 24–34)
MCHC RBC AUTO-ENTMCNC: 30.9 G/DL (ref 31–37)
MCV RBC AUTO: 100.2 FL (ref 78–100)
MONOCYTES # BLD: 0.51 K/UL (ref 0.05–1.2)
MONOCYTES NFR BLD: 6.3 % (ref 3–10)
NEUTS SEG # BLD: 4.71 K/UL (ref 1.8–8)
NEUTS SEG NFR BLD: 58.7 % (ref 40–73)
NRBC # BLD: 0 K/UL (ref 0–0.01)
NRBC BLD-RTO: 0 PER 100 WBC
PLATELET # BLD AUTO: 146 K/UL (ref 135–420)
PMV BLD AUTO: 12.8 FL (ref 9.2–11.8)
POTASSIUM SERPL-SCNC: 4 MMOL/L (ref 3.5–5.5)
PROT SERPL-MCNC: 7.1 G/DL (ref 6.4–8.2)
RBC # BLD AUTO: 4.3 M/UL (ref 4.2–5.3)
SODIUM SERPL-SCNC: 138 MMOL/L (ref 136–145)
T4 FREE SERPL-MCNC: 1.5 NG/DL (ref 0.7–1.5)
TRIGL SERPL-MCNC: 87 MG/DL
TSH SERPL DL<=0.05 MIU/L-ACNC: 3.42 UIU/ML (ref 0.36–3.74)
URATE SERPL-MCNC: 5 MG/DL (ref 2.6–7.2)
VIT B12 SERPL-MCNC: >2000 PG/ML (ref 211–911)
VLDLC SERPL CALC-MCNC: 17.4 MG/DL
WBC # BLD AUTO: 8 K/UL (ref 4.6–13.2)

## 2025-04-17 PROCEDURE — 82607 VITAMIN B-12: CPT

## 2025-04-17 PROCEDURE — 82306 VITAMIN D 25 HYDROXY: CPT

## 2025-04-17 PROCEDURE — 84443 ASSAY THYROID STIM HORMONE: CPT

## 2025-04-17 PROCEDURE — 83036 HEMOGLOBIN GLYCOSYLATED A1C: CPT

## 2025-04-17 PROCEDURE — 84550 ASSAY OF BLOOD/URIC ACID: CPT

## 2025-04-17 PROCEDURE — 85025 COMPLETE CBC W/AUTO DIFF WBC: CPT

## 2025-04-17 PROCEDURE — 80053 COMPREHEN METABOLIC PANEL: CPT

## 2025-04-17 PROCEDURE — 36415 COLL VENOUS BLD VENIPUNCTURE: CPT

## 2025-04-17 PROCEDURE — 82746 ASSAY OF FOLIC ACID SERUM: CPT

## 2025-04-17 PROCEDURE — 80061 LIPID PANEL: CPT

## 2025-04-17 PROCEDURE — 84439 ASSAY OF FREE THYROXINE: CPT

## 2025-04-24 ENCOUNTER — OFFICE VISIT (OUTPATIENT)
Facility: CLINIC | Age: 89
End: 2025-04-24
Payer: MEDICARE

## 2025-04-24 VITALS
HEART RATE: 68 BPM | BODY MASS INDEX: 33.49 KG/M2 | HEIGHT: 63 IN | OXYGEN SATURATION: 94 % | DIASTOLIC BLOOD PRESSURE: 71 MMHG | WEIGHT: 189 LBS | SYSTOLIC BLOOD PRESSURE: 113 MMHG

## 2025-04-24 DIAGNOSIS — I25.10 CORONARY ARTERY DISEASE INVOLVING NATIVE CORONARY ARTERY OF NATIVE HEART WITHOUT ANGINA PECTORIS: ICD-10-CM

## 2025-04-24 DIAGNOSIS — Z00.00 MEDICARE ANNUAL WELLNESS VISIT, SUBSEQUENT: Primary | ICD-10-CM

## 2025-04-24 DIAGNOSIS — M54.50 ACUTE LOW BACK PAIN WITHOUT SCIATICA, UNSPECIFIED BACK PAIN LATERALITY: ICD-10-CM

## 2025-04-24 DIAGNOSIS — H91.93 DECREASED HEARING OF BOTH EARS: ICD-10-CM

## 2025-04-24 DIAGNOSIS — G31.84 MILD NEUROCOGNITIVE DISORDER: ICD-10-CM

## 2025-04-24 DIAGNOSIS — R41.3 MEMORY DEFICIT: ICD-10-CM

## 2025-04-24 DIAGNOSIS — E78.5 HYPERLIPIDEMIA, UNSPECIFIED HYPERLIPIDEMIA TYPE: ICD-10-CM

## 2025-04-24 DIAGNOSIS — S22.080A COMPRESSION FRACTURE OF T11 VERTEBRA, INITIAL ENCOUNTER (HCC): ICD-10-CM

## 2025-04-24 DIAGNOSIS — E55.9 VITAMIN D DEFICIENCY: ICD-10-CM

## 2025-04-24 DIAGNOSIS — I50.32 CHRONIC DIASTOLIC CONGESTIVE HEART FAILURE (HCC): ICD-10-CM

## 2025-04-24 DIAGNOSIS — R73.03 PREDIABETES: ICD-10-CM

## 2025-04-24 DIAGNOSIS — I10 ESSENTIAL HYPERTENSION: ICD-10-CM

## 2025-04-24 PROCEDURE — 1036F TOBACCO NON-USER: CPT | Performed by: STUDENT IN AN ORGANIZED HEALTH CARE EDUCATION/TRAINING PROGRAM

## 2025-04-24 PROCEDURE — 1090F PRES/ABSN URINE INCON ASSESS: CPT | Performed by: STUDENT IN AN ORGANIZED HEALTH CARE EDUCATION/TRAINING PROGRAM

## 2025-04-24 PROCEDURE — G8417 CALC BMI ABV UP PARAM F/U: HCPCS | Performed by: STUDENT IN AN ORGANIZED HEALTH CARE EDUCATION/TRAINING PROGRAM

## 2025-04-24 PROCEDURE — G2211 COMPLEX E/M VISIT ADD ON: HCPCS | Performed by: STUDENT IN AN ORGANIZED HEALTH CARE EDUCATION/TRAINING PROGRAM

## 2025-04-24 PROCEDURE — G0439 PPPS, SUBSEQ VISIT: HCPCS | Performed by: STUDENT IN AN ORGANIZED HEALTH CARE EDUCATION/TRAINING PROGRAM

## 2025-04-24 PROCEDURE — 99214 OFFICE O/P EST MOD 30 MIN: CPT | Performed by: STUDENT IN AN ORGANIZED HEALTH CARE EDUCATION/TRAINING PROGRAM

## 2025-04-24 PROCEDURE — 1126F AMNT PAIN NOTED NONE PRSNT: CPT | Performed by: STUDENT IN AN ORGANIZED HEALTH CARE EDUCATION/TRAINING PROGRAM

## 2025-04-24 PROCEDURE — G8427 DOCREV CUR MEDS BY ELIG CLIN: HCPCS | Performed by: STUDENT IN AN ORGANIZED HEALTH CARE EDUCATION/TRAINING PROGRAM

## 2025-04-24 PROCEDURE — 1123F ACP DISCUSS/DSCN MKR DOCD: CPT | Performed by: STUDENT IN AN ORGANIZED HEALTH CARE EDUCATION/TRAINING PROGRAM

## 2025-04-24 PROCEDURE — 1159F MED LIST DOCD IN RCRD: CPT | Performed by: STUDENT IN AN ORGANIZED HEALTH CARE EDUCATION/TRAINING PROGRAM

## 2025-04-24 PROCEDURE — 1160F RVW MEDS BY RX/DR IN RCRD: CPT | Performed by: STUDENT IN AN ORGANIZED HEALTH CARE EDUCATION/TRAINING PROGRAM

## 2025-04-24 RX ORDER — DONEPEZIL HYDROCHLORIDE 5 MG/1
5 TABLET, FILM COATED ORAL NIGHTLY
Qty: 30 TABLET | Refills: 3 | Status: SHIPPED | OUTPATIENT
Start: 2025-04-24

## 2025-04-24 ASSESSMENT — LIFESTYLE VARIABLES
HOW MANY STANDARD DRINKS CONTAINING ALCOHOL DO YOU HAVE ON A TYPICAL DAY: PATIENT DOES NOT DRINK
HOW OFTEN DO YOU HAVE A DRINK CONTAINING ALCOHOL: NEVER

## 2025-04-24 ASSESSMENT — ENCOUNTER SYMPTOMS
ABDOMINAL PAIN: 0
RHINORRHEA: 0
WHEEZING: 0
CHEST TIGHTNESS: 0
DIARRHEA: 0
BLOOD IN STOOL: 0
SHORTNESS OF BREATH: 0
VOMITING: 0
NAUSEA: 0
COUGH: 0

## 2025-04-24 ASSESSMENT — MINI MENTAL STATE EXAM
PLACE DESIGN, ERASER AND PENCIL IN FRONT OF THE PERSON.  SAY:  COPY THIS DESIGN PLEASE.  SHOW: DESIGN. ALLOW: MULTIPLE TRIES. WAIT UNTIL PERSON IS FINISHED AND HANDS IT BACK. SCORE: ONLY FOR DIAGRAM WITH 4-SIDED FIGURE BETWEEN TWO 5-SIDED FIGURES: 1
SAY: I AM GOING TO NAME THREE OBJECTS. WHEN I AM FINISHED, I WANT YOU TO REPEAT
THEM. REMEMBER WHAT THEY ARE BECAUSE I AM GOING TO ASK YOU TO NAME THEM AGAIN IN
A FEW MINUTES.  SAY THE FOLLOWING WORDS SLOWLY AT 1-SECOND INTERVALS - BALL/ CAR/ MAN [ITERATIONS FOR REPEAT ADMINISTRATION]: 3
SAY: I WOULD LIKE YOU TO COUNT BACKWARD FROM 100 BY SEVENS: 5
SUM ALL MMSE QUESTIONS FOR TOTAL SCORE [OUT OF 30].: 26
NOW WHAT WERE THE THREE OBJECTS I ASKED YOU TO REMEMBER?: 0
WHAT FLOOR ARE WE ON [IN FACILITY]?/ WHAT ROOM ARE WE IN [IN HOME]?: 1
HAND THE PERSON A PENCIL AND PAPER. SAY: WRITE ANY COMPLETE SENTENCE ON THAT
PIECE OF PAPER. (NOTE: THE SENTENCE MUST MAKE SENSE. IGNORE SPELLING ERRORS): 1
WHAT MONTH IS THIS?: 1
WHAT YEAR IS THIS?: 0
SAY: PUT THE PAPER DOWN ON THE FLOOR, SCORE IF PAPER IS PLACED BACK ON FLOOR: 1
SHOW: PENCIL [OBJECT] ASK: WHAT IS THIS CALLED?: 1
SHOW: WRISTWATCH [OBJECT] ASK: WHAT IS THIS CALLED?: 1
WHAT IS TODAY'S DATE?: 1
ASK THE PERSON IF HE IS RIGHT OR LEFT-HANDED. TAKE A PIECE OF PAPER AND HOLD IT UP IN
FRONT OF THE PERSON. SAY: TAKE THIS PAPER IN YOUR RIGHT/LEFT HAND (WHICHEVER IS NON-
DOMINANT), SCORE IF PAPER IS PICKED UP IN CORRECT HAND.: 1
WHAT CITY/TOWN ARE WE IN?: 1
SAY: READ THE WORDS ON THE PAGE AND THEN DO WHAT IT SAYS. THEN HAND THE PERSON
THE SHEET WITH CLOSE YOUR EYES ON IT. IF THE SUBJECT READS AND DOES NOT CLOSE THEIR EYES, REPEAT UP TO THREE TIMES. SCORE ONLY IF SUBJECT CLOSES EYES.: 1
SAY: I WOULD LIKE YOU TO REPEAT THIS PHRASE AFTER ME: NO IFS, ANDS, OR BUTS.: 1
WHAT DAY OF THE WEEK IS THIS?: 1
SAY: FOLD THE PAPER IN HALF ONCE WITH BOTH HANDS, SCORE IF PAPER IS CORRECTLY FOLDED IN HALF.: 1
WHAT COUNTRY ARE WE IN?: 1
WHICH SEASON IS THIS?: 1
WHAT IS THE NAME OF THIS BUILDING [IN FACILITY]?/WHAT IS THE STREET ADDRESS OF THIS HOUSE [IN HOME]?: 1
WHAT STATE [OR PROVINCE] ARE WE IN?: 1

## 2025-04-24 ASSESSMENT — PATIENT HEALTH QUESTIONNAIRE - PHQ9
1. LITTLE INTEREST OR PLEASURE IN DOING THINGS: NOT AT ALL
SUM OF ALL RESPONSES TO PHQ QUESTIONS 1-9: 1
2. FEELING DOWN, DEPRESSED OR HOPELESS: SEVERAL DAYS

## 2025-04-24 NOTE — PROGRESS NOTES
Juliette Treviño is a 89 y.o. female presenting today for Other (Patient present for AWV.  Patient would like to discuss labs.  )  .     Chief Complaint   Patient presents with    Other     Patient present for AWV.  Patient would like to discuss labs.         HPI:  Juliette Treviño presents to the office today for follow-up.    Patient has a history of  HTN, HLD, hypothyroidism, CAD, HFpEF, prediabetes, RCC, breast cancer.    Patient has a history of right breast cancer-s/p right partial  mastectomy..  Following with Dr. Choudhary.   She is s/p hysterectomy, oophorectomy, right nephrectomy for RCC,    Bone density in 8/2024 was within normal limits.    She was seen by Dr. Guerrier on 3/3/2025 due to acute worsening back pain.  X-ray showed compression fracture at T11 with degenerative disc disease    CAD/CHF: Patient is following with cardiology.  She is on aspirin 81 mg daily, Lipitor 10 mg daily.  She has been advised fluid and salt restriction.    HTN: Patient is currently taking HCTZ 12.5 mg daily and olmesartan 40 mg daily.  BP is controlled.    Vitamin D deficiency: Patient was taking high-dose vitamin D supplements with level increasing to 102.  She was advised to take 100 IU twice daily after that.  Has not had a repeat level checked.    Patient noted to have memory deficits, decreased short-term recall.  Prior notes document history of hypothyroidism but patient is not on any thyroid supplements.  She has been taking Prevagen and multivitamins.  Mini cog:word recall 1/3, clock was abnormal.  Vitamin D was 76.3, vitamin B12 greater than 2000, folate greater than 20.  Thyroid test was normal    Prediabetes: HbA1c was 6%        Review of Systems   Constitutional:  Negative for activity change, appetite change, chills, diaphoresis, fatigue, fever and unexpected weight change.   HENT:  Negative for congestion, nosebleeds, postnasal drip and rhinorrhea.    Respiratory:  Negative for cough, chest tightness,

## 2025-04-24 NOTE — PROGRESS NOTES
Medicare Annual Wellness Visit    Juliette Treviño is here for Other (Patient present for AWV.  Patient would like to discuss labs.  )    Assessment & Plan   Medicare annual wellness visit, subsequent    Patient provided with ACP document     Return in about 4 months (around 8/24/2025).     Subjective       Patient's complete Health Risk Assessment and screening values have been reviewed and are found in Flowsheets. The following problems were reviewed today and where indicated follow up appointments were made and/or referrals ordered.    Positive Risk Factor Screenings with Interventions:     Cognitive:   Clock Drawing Test (CDT): (!) Abnormal  Words recalled: 2 Words Recalled  Total Score: (!) 2  Total Score Interpretation: Abnormal Mini-Cog  Interventions:  Patient is undergoing evaluation for dementia            Inactivity:  On average, how many days per week do you engage in moderate to strenuous exercise (like a brisk walk)?: 0 days (!) Abnormal  On average, how many minutes do you engage in exercise at this level?: 0 min  Interventions:  See AVS for additional education material    Poor Eating Habits/Diet:  Do you eat balanced/healthy meals regularly?: (!) No  Interventions:  See AVS for additional education material    Abnormal BMI (obese):  Body mass index is 33.48 kg/m². (!) Abnormal  Interventions:  AVS provided         Hearing Screen:  Do you or your family notice any trouble with your hearing that hasn't been managed with hearing aids?: (!) Yes (slight hearing loss)    Interventions:  Referred to ENT     Safety:  Do you have working smoke detectors?: (!) No  Do you have any tripping hazards - loose or unsecured carpets or rugs?: (!) Yes  Do you have non-slip mats or non-slip surfaces or shower bars or grab bars in your shower or bathtub?: (!) No  Interventions:  Counseled on safety    ADL's:   Patient reports needing help with:  Select all that apply: (!) Laundry, Housekeeping, Banking/Finances,

## 2025-04-24 NOTE — PATIENT INSTRUCTIONS
are having a problem with your medicine.     If your doctor recommends aspirin, take the amount directed each day. Make sure you take aspirin and not another kind of pain reliever, such as acetaminophen (Tylenol).   When should you call for help?   Call 911 if you have symptoms of a heart attack. These may include:    Chest pain or pressure, or a strange feeling in the chest.     Sweating.     Shortness of breath.     Pain, pressure, or a strange feeling in the back, neck, jaw, or upper belly or in one or both shoulders or arms.     Lightheadedness or sudden weakness.     A fast or irregular heartbeat.   After you call 911, the  may tell you to chew 1 adult-strength or 2 to 4 low-dose aspirin. Wait for an ambulance. Do not try to drive yourself.  Watch closely for changes in your health, and be sure to contact your doctor if you have any problems.  Where can you learn more?  Go to https://www.[employer].net/patientEd and enter F075 to learn more about \"A Healthy Heart: Care Instructions.\"  Current as of: July 31, 2024  Content Version: 14.4  © 1712-0249 [employer].   Care instructions adapted under license by [employer]. If you have questions about a medical condition or this instruction, always ask your healthcare professional. [email or web address] disclaims any warranty or liability for your use of this information.    Personalized Preventive Plan for Juliette Treviño - 4/24/2025  Medicare offers a range of preventive health benefits. Some of the tests and screenings are paid in full while other may be subject to a deductible, co-insurance, and/or copay.  Some of these benefits include a comprehensive review of your medical history including lifestyle, illnesses that may run in your family, and various assessments and screenings as appropriate.  After reviewing your medical record and screening and assessments performed today your provider may have ordered immunizations, labs, imaging,

## 2025-04-25 RX ORDER — OLMESARTAN MEDOXOMIL AND HYDROCHLOROTHIAZIDE 40/12.5 40; 12.5 MG/1; MG/1
1 TABLET ORAL DAILY
Qty: 90 TABLET | Refills: 1 | Status: SHIPPED | OUTPATIENT
Start: 2025-04-25

## 2025-05-05 NOTE — PROGRESS NOTES
HISTORY OF PRESENT ILLNESS  Juliette Treviño is a 89 y.o. female.    PMH Chronic heart failure preserved ejection fraction, CAD, HTN, HLD  ----  CARDIAC STUDIES  ----  2d echo 8/2022   Left Ventricle: Normal left ventricular systolic function with a visually estimated EF of 60 - 65%. Left ventricle size is normal. Increased wall thickness. Findings consistent with mild concentric hypertrophy. Normal wall motion.   ----  Stress Test 2007  Evidence of mild defect and partial reversibility may be secondary to soft tissue attenuation artifact, however one vessel CAD cannot but ruled out  ----    Have you had Fatigue?  No     2.   Have you had have you had Chest Pain? No      3.   Have you had Dyspnea (SOB) ? No   4.   Have you had Orthopnea? No      5.   Have you had PND? No  6.   Have you had leg swelling? No   7.    Have you had any weight gain? No      8. Have you had any palpitations? No      9. Have you had any syncope? No   10. Do you have any wounds on legs?no       Reviewed with the patient and is as such.        Family History   Problem Relation Age of Onset    Breast Cancer Other     Breast Cancer Sister     Cancer Sister 40        Breast    Diabetes Other         parent    Cancer Mother         colon     Heart Disease Father          Past Medical History:   Diagnosis Date    Breast cancer (HCC) 3/12    Cancer (HCC)     renal    Cancer (HCC)     Right breast    Chronic diastolic heart failure (HCC) 8/15/2014    exertional sob stable nyha class2     Colon polyps     Congestive heart failure (HCC)     Coronary atherosclerosis of native coronary artery     Essential hypertension, benign     Heart murmur     History of breast cancer     Hypertension     Mitral valve disorders(424.0)     Mitral valve regurgitation     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

## 2025-05-07 ENCOUNTER — OFFICE VISIT (OUTPATIENT)
Age: 89
End: 2025-05-07
Payer: MEDICARE

## 2025-05-07 VITALS
HEART RATE: 73 BPM | WEIGHT: 186 LBS | SYSTOLIC BLOOD PRESSURE: 125 MMHG | DIASTOLIC BLOOD PRESSURE: 65 MMHG | HEIGHT: 63 IN | BODY MASS INDEX: 32.96 KG/M2 | OXYGEN SATURATION: 96 %

## 2025-05-07 DIAGNOSIS — I50.32 CHRONIC HEART FAILURE WITH PRESERVED EJECTION FRACTION (HCC): Primary | ICD-10-CM

## 2025-05-07 DIAGNOSIS — E78.49 OTHER HYPERLIPIDEMIA: ICD-10-CM

## 2025-05-07 DIAGNOSIS — I10 HYPERTENSION, UNSPECIFIED TYPE: ICD-10-CM

## 2025-05-07 PROCEDURE — 1090F PRES/ABSN URINE INCON ASSESS: CPT | Performed by: INTERNAL MEDICINE

## 2025-05-07 PROCEDURE — G8417 CALC BMI ABV UP PARAM F/U: HCPCS | Performed by: INTERNAL MEDICINE

## 2025-05-07 PROCEDURE — 99214 OFFICE O/P EST MOD 30 MIN: CPT | Performed by: INTERNAL MEDICINE

## 2025-05-07 PROCEDURE — 1036F TOBACCO NON-USER: CPT | Performed by: INTERNAL MEDICINE

## 2025-05-07 PROCEDURE — G8428 CUR MEDS NOT DOCUMENT: HCPCS | Performed by: INTERNAL MEDICINE

## 2025-05-07 PROCEDURE — 1123F ACP DISCUSS/DSCN MKR DOCD: CPT | Performed by: INTERNAL MEDICINE

## 2025-05-07 NOTE — PROGRESS NOTES
Have you had Fatigue?  No    2.   Have you had have you had Chest Pain? No     3.   Have you had Dyspnea (SOB) ? No   4.   Have you had Orthopnea? No     5.   Have you had PND? No  6.   Have you had leg swelling? No   7.    Have you had any weight gain? No     8. Have you had any palpitations? No      9. Have you had any syncope? No   10. Do you have any wounds on legs?no

## 2025-05-07 NOTE — PATIENT INSTRUCTIONS
Learning About the Mediterranean Diet  What is the Mediterranean diet?     The Mediterranean diet is a style of eating rather than a diet plan. It features foods eaten in [place], [place], [place], and other countries along the [place] Sea. It emphasizes eating foods like fish, fruits, vegetables, beans, high-fiber breads and whole grains, nuts, and olive oil. This style of eating includes limited red meat, cheese, and sweets.  Why choose the Mediterranean diet?  A Mediterranean-style diet may improve heart health. It contains more fat than other heart-healthy diets. But the fats are mainly from nuts, unsaturated oils (such as fish oils and olive oil), and certain nut or seed oils (such as canola, soybean, or flaxseed oil). These fats may help protect the heart and blood vessels.  How can you get started on the Mediterranean diet?  Here are some things you can do to switch to a more Mediterranean way of eating.  What to eat  Eat a variety of fruits and vegetables each day, such as grapes, blueberries, tomatoes, broccoli, peppers, figs, olives, spinach, eggplant, beans, lentils, and chickpeas.  Eat a variety of whole-grain foods each day, such as oats, brown rice, and whole wheat bread, pasta, and couscous.  Eat fish at least 2 times a week. Try tuna, salmon, mackerel, lake trout, herring, or sardines.  Eat moderate amounts of low-fat dairy products, such as milk, cheese, or yogurt.  Eat moderate amounts of poultry and eggs.  Choose healthy (unsaturated) fats, such as nuts, olive oil, and certain nut or seed oils like canola, soybean, and flaxseed.  Limit unhealthy (saturated) fats, such as butter, palm oil, and coconut oil. And limit fats found in animal products, such as meat and dairy products made with whole milk. Try to eat red meat only a few times a month in very small amounts.  Limit sweets and desserts to only a few times a week. This includes sugar-sweetened drinks like soda.  The

## 2025-05-16 DIAGNOSIS — R41.3 MEMORY DEFICIT: ICD-10-CM

## 2025-05-16 RX ORDER — DONEPEZIL HYDROCHLORIDE 5 MG/1
5 TABLET, FILM COATED ORAL NIGHTLY
Qty: 90 TABLET | Refills: 3 | Status: SHIPPED | OUTPATIENT
Start: 2025-05-16

## 2025-06-10 ENCOUNTER — TELEPHONE (OUTPATIENT)
Facility: CLINIC | Age: 89
End: 2025-06-10

## 2025-06-10 DIAGNOSIS — F41.9 ANXIETY: Primary | ICD-10-CM

## 2025-06-10 RX ORDER — LORAZEPAM 1 MG/1
TABLET ORAL
Qty: 2 TABLET | Refills: 0 | Status: SHIPPED | OUTPATIENT
Start: 2025-06-10 | End: 2025-07-15

## 2025-06-10 NOTE — TELEPHONE ENCOUNTER
Patient's daughter states patient is having MRI head on Thursday.  She is requesting something for claustrophobia to [facility] in [place].Please advise.

## 2025-06-12 ENCOUNTER — HOSPITAL ENCOUNTER (OUTPATIENT)
Age: 89
Discharge: HOME | End: 2025-06-15
Attending: STUDENT IN AN ORGANIZED HEALTH CARE EDUCATION/TRAINING PROGRAM
Payer: MEDICARE

## 2025-06-12 DIAGNOSIS — R41.3 MEMORY DEFICIT: ICD-10-CM

## 2025-06-12 PROCEDURE — 70551 MRI BRAIN STEM W/O DYE: CPT

## 2025-06-19 ENCOUNTER — TELEPHONE (OUTPATIENT)
Facility: CLINIC | Age: 89
End: 2025-06-19

## 2025-06-19 DIAGNOSIS — Z87.39 HISTORY OF GOUT: Primary | ICD-10-CM

## 2025-06-19 RX ORDER — ALLOPURINOL 100 MG/1
200 TABLET ORAL DAILY
Qty: 180 TABLET | Refills: 1 | Status: SHIPPED | OUTPATIENT
Start: 2025-06-19

## 2025-06-19 NOTE — TELEPHONE ENCOUNTER
Pt's daughter requesting refill for pt    Allopurinol 100 MG tablet    [facility]    [address] [place] [address]    [phone]

## 2025-07-08 ENCOUNTER — TELEPHONE (OUTPATIENT)
Facility: CLINIC | Age: 89
End: 2025-07-08

## 2025-07-08 DIAGNOSIS — B00.1 COLD SORE: Primary | ICD-10-CM

## 2025-07-08 RX ORDER — VALACYCLOVIR HYDROCHLORIDE 1 G/1
TABLET, FILM COATED ORAL
Qty: 30 TABLET | Refills: 2 | Status: SHIPPED | OUTPATIENT
Start: 2025-07-08

## 2025-07-08 NOTE — TELEPHONE ENCOUNTER
Patient request refill for the following to [facility].   Valtrex  She has a break out on her bottom.  She states she was previously prescribed prn for out breaks.

## 2025-08-14 ENCOUNTER — OFFICE VISIT (OUTPATIENT)
Facility: CLINIC | Age: 89
End: 2025-08-14
Payer: MEDICARE

## 2025-08-14 VITALS
BODY MASS INDEX: 33.52 KG/M2 | SYSTOLIC BLOOD PRESSURE: 128 MMHG | HEART RATE: 65 BPM | WEIGHT: 189.2 LBS | OXYGEN SATURATION: 95 % | DIASTOLIC BLOOD PRESSURE: 77 MMHG

## 2025-08-14 DIAGNOSIS — F02.A0 MILD ALZHEIMER'S DEMENTIA WITHOUT BEHAVIORAL DISTURBANCE, PSYCHOTIC DISTURBANCE, MOOD DISTURBANCE, OR ANXIETY, UNSPECIFIED TIMING OF DEMENTIA ONSET (HCC): Primary | ICD-10-CM

## 2025-08-14 DIAGNOSIS — I25.10 CORONARY ARTERY DISEASE INVOLVING NATIVE CORONARY ARTERY OF NATIVE HEART WITHOUT ANGINA PECTORIS: ICD-10-CM

## 2025-08-14 DIAGNOSIS — I50.32 CHRONIC DIASTOLIC CONGESTIVE HEART FAILURE (HCC): ICD-10-CM

## 2025-08-14 DIAGNOSIS — R73.03 PREDIABETES: ICD-10-CM

## 2025-08-14 DIAGNOSIS — I10 ESSENTIAL HYPERTENSION: ICD-10-CM

## 2025-08-14 DIAGNOSIS — R19.5 DARK STOOLS: ICD-10-CM

## 2025-08-14 DIAGNOSIS — R41.3 MEMORY DEFICIT: ICD-10-CM

## 2025-08-14 DIAGNOSIS — G30.9 MILD ALZHEIMER'S DEMENTIA WITHOUT BEHAVIORAL DISTURBANCE, PSYCHOTIC DISTURBANCE, MOOD DISTURBANCE, OR ANXIETY, UNSPECIFIED TIMING OF DEMENTIA ONSET (HCC): Primary | ICD-10-CM

## 2025-08-14 PROCEDURE — G8417 CALC BMI ABV UP PARAM F/U: HCPCS | Performed by: STUDENT IN AN ORGANIZED HEALTH CARE EDUCATION/TRAINING PROGRAM

## 2025-08-14 PROCEDURE — 1126F AMNT PAIN NOTED NONE PRSNT: CPT | Performed by: STUDENT IN AN ORGANIZED HEALTH CARE EDUCATION/TRAINING PROGRAM

## 2025-08-14 PROCEDURE — 99214 OFFICE O/P EST MOD 30 MIN: CPT | Performed by: STUDENT IN AN ORGANIZED HEALTH CARE EDUCATION/TRAINING PROGRAM

## 2025-08-14 PROCEDURE — 1036F TOBACCO NON-USER: CPT | Performed by: STUDENT IN AN ORGANIZED HEALTH CARE EDUCATION/TRAINING PROGRAM

## 2025-08-14 PROCEDURE — 1090F PRES/ABSN URINE INCON ASSESS: CPT | Performed by: STUDENT IN AN ORGANIZED HEALTH CARE EDUCATION/TRAINING PROGRAM

## 2025-08-14 PROCEDURE — 1159F MED LIST DOCD IN RCRD: CPT | Performed by: STUDENT IN AN ORGANIZED HEALTH CARE EDUCATION/TRAINING PROGRAM

## 2025-08-14 PROCEDURE — G2211 COMPLEX E/M VISIT ADD ON: HCPCS | Performed by: STUDENT IN AN ORGANIZED HEALTH CARE EDUCATION/TRAINING PROGRAM

## 2025-08-14 PROCEDURE — 1160F RVW MEDS BY RX/DR IN RCRD: CPT | Performed by: STUDENT IN AN ORGANIZED HEALTH CARE EDUCATION/TRAINING PROGRAM

## 2025-08-14 PROCEDURE — 1123F ACP DISCUSS/DSCN MKR DOCD: CPT | Performed by: STUDENT IN AN ORGANIZED HEALTH CARE EDUCATION/TRAINING PROGRAM

## 2025-08-14 PROCEDURE — G8427 DOCREV CUR MEDS BY ELIG CLIN: HCPCS | Performed by: STUDENT IN AN ORGANIZED HEALTH CARE EDUCATION/TRAINING PROGRAM

## 2025-08-14 SDOH — ECONOMIC STABILITY: FOOD INSECURITY: WITHIN THE PAST 12 MONTHS, THE FOOD YOU BOUGHT JUST DIDN'T LAST AND YOU DIDN'T HAVE MONEY TO GET MORE.: NEVER TRUE

## 2025-08-14 SDOH — ECONOMIC STABILITY: FOOD INSECURITY: WITHIN THE PAST 12 MONTHS, YOU WORRIED THAT YOUR FOOD WOULD RUN OUT BEFORE YOU GOT MONEY TO BUY MORE.: NEVER TRUE

## 2025-08-14 ASSESSMENT — ENCOUNTER SYMPTOMS
SHORTNESS OF BREATH: 0
NAUSEA: 0
RHINORRHEA: 0
COUGH: 0
WHEEZING: 0
CHEST TIGHTNESS: 0
DIARRHEA: 0
BLOOD IN STOOL: 0
VOMITING: 0
ABDOMINAL PAIN: 0

## 2025-08-14 ASSESSMENT — PATIENT HEALTH QUESTIONNAIRE - PHQ9
SUM OF ALL RESPONSES TO PHQ QUESTIONS 1-9: 1
2. FEELING DOWN, DEPRESSED OR HOPELESS: SEVERAL DAYS
SUM OF ALL RESPONSES TO PHQ QUESTIONS 1-9: 1
1. LITTLE INTEREST OR PLEASURE IN DOING THINGS: NOT AT ALL

## 2025-08-20 ENCOUNTER — HOSPITAL ENCOUNTER (OUTPATIENT)
Facility: HOSPITAL | Age: 89
Setting detail: SPECIMEN
Discharge: HOME | End: 2025-08-23
Payer: MEDICARE

## 2025-08-20 DIAGNOSIS — R73.03 PREDIABETES: ICD-10-CM

## 2025-08-20 DIAGNOSIS — I10 ESSENTIAL HYPERTENSION: ICD-10-CM

## 2025-08-20 DIAGNOSIS — R19.5 DARK STOOLS: ICD-10-CM

## 2025-08-20 LAB
ANION GAP SERPL CALC-SCNC: 12 MMOL/L (ref 3–18)
BUN SERPL-MCNC: 23 MG/DL (ref 6–23)
BUN/CREAT SERPL: 19 (ref 12–20)
CALCIUM SERPL-MCNC: 9.5 MG/DL (ref 8.5–10.1)
CHLORIDE SERPL-SCNC: 105 MMOL/L (ref 98–107)
CO2 SERPL-SCNC: 25 MMOL/L (ref 21–32)
CREAT SERPL-MCNC: 1.19 MG/DL (ref 0.6–1.3)
ERYTHROCYTE [DISTWIDTH] IN BLOOD BY AUTOMATED COUNT: 14.5 % (ref 11.6–14.5)
EST. AVERAGE GLUCOSE BLD GHB EST-MCNC: 127 MG/DL
GLUCOSE SERPL-MCNC: 117 MG/DL (ref 74–108)
HBA1C MFR BLD: 6.1 % (ref 4.2–5.6)
HCT VFR BLD AUTO: 39.6 % (ref 35–45)
HGB BLD-MCNC: 12.2 G/DL (ref 12–16)
MCH RBC QN AUTO: 30.6 PG (ref 24–34)
MCHC RBC AUTO-ENTMCNC: 30.8 G/DL (ref 31–37)
MCV RBC AUTO: 99.2 FL (ref 78–100)
NRBC # BLD: 0 K/UL (ref 0–0.01)
NRBC BLD-RTO: 0 PER 100 WBC
PLATELET # BLD AUTO: 122 K/UL (ref 135–420)
PMV BLD AUTO: 13 FL (ref 9.2–11.8)
POTASSIUM SERPL-SCNC: 3.7 MMOL/L (ref 3.5–5.5)
RBC # BLD AUTO: 3.99 M/UL (ref 4.2–5.3)
SODIUM SERPL-SCNC: 142 MMOL/L (ref 136–145)
WBC # BLD AUTO: 7.2 K/UL (ref 4.6–13.2)

## 2025-08-20 PROCEDURE — 85027 COMPLETE CBC AUTOMATED: CPT

## 2025-08-20 PROCEDURE — 83036 HEMOGLOBIN GLYCOSYLATED A1C: CPT

## 2025-08-20 PROCEDURE — 80048 BASIC METABOLIC PNL TOTAL CA: CPT

## 2025-08-20 PROCEDURE — 36415 COLL VENOUS BLD VENIPUNCTURE: CPT

## 2025-08-28 ENCOUNTER — TRANSCRIBE ORDERS (OUTPATIENT)
Facility: HOSPITAL | Age: 89
End: 2025-08-28

## 2025-08-28 DIAGNOSIS — Z12.31 ENCOUNTER FOR SCREENING MAMMOGRAM FOR MALIGNANT NEOPLASM OF BREAST: Primary | ICD-10-CM
